# Patient Record
Sex: MALE | Race: WHITE | Employment: OTHER | URBAN - METROPOLITAN AREA
[De-identification: names, ages, dates, MRNs, and addresses within clinical notes are randomized per-mention and may not be internally consistent; named-entity substitution may affect disease eponyms.]

---

## 2017-01-27 ENCOUNTER — OFFICE VISIT (OUTPATIENT)
Dept: FAMILY MEDICINE CLINIC | Age: 82
End: 2017-01-27

## 2017-01-27 VITALS
HEIGHT: 68 IN | WEIGHT: 185 LBS | SYSTOLIC BLOOD PRESSURE: 170 MMHG | DIASTOLIC BLOOD PRESSURE: 100 MMHG | OXYGEN SATURATION: 94 % | HEART RATE: 71 BPM | BODY MASS INDEX: 28.04 KG/M2

## 2017-01-27 DIAGNOSIS — E78.2 MIXED HYPERLIPIDEMIA: ICD-10-CM

## 2017-01-27 DIAGNOSIS — I10 ESSENTIAL HYPERTENSION: Primary | ICD-10-CM

## 2017-01-27 DIAGNOSIS — M54.9 CHRONIC BACK PAIN, UNSPECIFIED BACK LOCATION, UNSPECIFIED BACK PAIN LATERALITY: ICD-10-CM

## 2017-01-27 DIAGNOSIS — Z79.899 LONG TERM USE OF DRUG: ICD-10-CM

## 2017-01-27 DIAGNOSIS — G89.29 CHRONIC BACK PAIN, UNSPECIFIED BACK LOCATION, UNSPECIFIED BACK PAIN LATERALITY: ICD-10-CM

## 2017-01-27 DIAGNOSIS — K21.9 GASTROESOPHAGEAL REFLUX DISEASE WITHOUT ESOPHAGITIS: ICD-10-CM

## 2017-01-27 DIAGNOSIS — J44.9 CHRONIC OBSTRUCTIVE PULMONARY DISEASE, UNSPECIFIED COPD TYPE (HCC): ICD-10-CM

## 2017-01-27 LAB
A/G RATIO: 1.3 (ref 1.1–2.2)
ALBUMIN SERPL-MCNC: 3.8 G/DL (ref 3.4–5)
ALP BLD-CCNC: 121 U/L (ref 40–129)
ALT SERPL-CCNC: 10 U/L (ref 10–40)
AMPHETAMINE SCREEN, URINE: NORMAL
ANION GAP SERPL CALCULATED.3IONS-SCNC: 17 MMOL/L (ref 3–16)
AST SERPL-CCNC: 14 U/L (ref 15–37)
BARBITURATE SCREEN, URINE: NORMAL
BASOPHILS ABSOLUTE: 0 K/UL (ref 0–0.2)
BASOPHILS RELATIVE PERCENT: 0.5 %
BENZODIAZEPINE SCREEN, URINE: NORMAL
BILIRUB SERPL-MCNC: <0.2 MG/DL (ref 0–1)
BUN BLDV-MCNC: 19 MG/DL (ref 7–20)
CALCIUM SERPL-MCNC: 8.5 MG/DL (ref 8.3–10.6)
CHLORIDE BLD-SCNC: 108 MMOL/L (ref 99–110)
CHOLESTEROL, TOTAL: 149 MG/DL (ref 0–199)
CO2: 24 MMOL/L (ref 21–32)
COCAINE METABOLITE SCREEN URINE: NORMAL
CREAT SERPL-MCNC: 1.2 MG/DL (ref 0.8–1.3)
EOSINOPHILS ABSOLUTE: 0.9 K/UL (ref 0–0.6)
EOSINOPHILS RELATIVE PERCENT: 10.9 %
GFR AFRICAN AMERICAN: >60
GFR NON-AFRICAN AMERICAN: 58
GLOBULIN: 2.9 G/DL
GLUCOSE BLD-MCNC: 94 MG/DL (ref 70–99)
HCT VFR BLD CALC: 38.6 % (ref 40.5–52.5)
HDLC SERPL-MCNC: 35 MG/DL (ref 40–60)
HEMOGLOBIN: 13.1 G/DL (ref 13.5–17.5)
LDL CHOLESTEROL CALCULATED: 67 MG/DL
LYMPHOCYTES ABSOLUTE: 1.6 K/UL (ref 1–5.1)
LYMPHOCYTES RELATIVE PERCENT: 20.2 %
MCH RBC QN AUTO: 33.4 PG (ref 26–34)
MCHC RBC AUTO-ENTMCNC: 34 G/DL (ref 31–36)
MCV RBC AUTO: 98.2 FL (ref 80–100)
MDMA URINE: NORMAL
METHADONE SCREEN, URINE: NORMAL
METHAMPHETAMINE, URINE: NORMAL
MONOCYTES ABSOLUTE: 0.8 K/UL (ref 0–1.3)
MONOCYTES RELATIVE PERCENT: 9.6 %
NEUTROPHILS ABSOLUTE: 4.7 K/UL (ref 1.7–7.7)
NEUTROPHILS RELATIVE PERCENT: 58.8 %
OPIATE SCREEN URINE: NORMAL
OXYCODONE SCREEN URINE: NORMAL
PDW BLD-RTO: 14.6 % (ref 12.4–15.4)
PHENCYCLIDINE SCREEN URINE: NORMAL
PLATELET # BLD: 191 K/UL (ref 135–450)
PMV BLD AUTO: 7.4 FL (ref 5–10.5)
POTASSIUM SERPL-SCNC: 4.4 MMOL/L (ref 3.5–5.1)
PROPOXYPHENE SCREEN, URINE: NORMAL
RBC # BLD: 3.93 M/UL (ref 4.2–5.9)
SODIUM BLD-SCNC: 149 MMOL/L (ref 136–145)
THC: NORMAL
TOTAL PROTEIN: 6.7 G/DL (ref 6.4–8.2)
TRICYCLIC ANTIDEPRESSANTS, UR: NORMAL
TRIGL SERPL-MCNC: 233 MG/DL (ref 0–150)
TSH SERPL DL<=0.05 MIU/L-ACNC: 1.18 UIU/ML (ref 0.27–4.2)
VLDLC SERPL CALC-MCNC: 47 MG/DL
WBC # BLD: 8 K/UL (ref 4–11)

## 2017-01-27 PROCEDURE — 99214 OFFICE O/P EST MOD 30 MIN: CPT | Performed by: NURSE PRACTITIONER

## 2017-01-27 PROCEDURE — 80305 DRUG TEST PRSMV DIR OPT OBS: CPT | Performed by: NURSE PRACTITIONER

## 2017-01-27 PROCEDURE — 36415 COLL VENOUS BLD VENIPUNCTURE: CPT | Performed by: NURSE PRACTITIONER

## 2017-01-27 RX ORDER — HYDROCODONE BITARTRATE AND ACETAMINOPHEN 7.5; 325 MG/1; MG/1
1 TABLET ORAL EVERY 8 HOURS PRN
Qty: 90 TABLET | Refills: 0 | Status: SHIPPED | OUTPATIENT
Start: 2017-01-27 | End: 2017-05-02 | Stop reason: SDUPTHER

## 2017-01-27 RX ORDER — HYDROCODONE BITARTRATE AND ACETAMINOPHEN 7.5; 325 MG/1; MG/1
1 TABLET ORAL EVERY 8 HOURS PRN
Qty: 90 TABLET | Refills: 0 | Status: SHIPPED | OUTPATIENT
Start: 2017-02-26 | End: 2017-05-02 | Stop reason: SDUPTHER

## 2017-01-27 RX ORDER — DILTIAZEM HCL 90 MG
90 TABLET ORAL 2 TIMES DAILY
Qty: 60 TABLET | Refills: 2 | Status: SHIPPED | OUTPATIENT
Start: 2017-01-27 | End: 2017-04-19 | Stop reason: SDUPTHER

## 2017-01-27 RX ORDER — IRBESARTAN 300 MG/1
300 TABLET ORAL DAILY
Qty: 30 TABLET | Refills: 2 | Status: SHIPPED | OUTPATIENT
Start: 2017-01-27 | End: 2017-04-19 | Stop reason: SDUPTHER

## 2017-01-27 RX ORDER — HYDROCODONE BITARTRATE AND ACETAMINOPHEN 7.5; 325 MG/1; MG/1
1 TABLET ORAL EVERY 8 HOURS PRN
Qty: 90 TABLET | Refills: 0 | Status: SHIPPED | OUTPATIENT
Start: 2017-03-28 | End: 2017-05-02 | Stop reason: SDUPTHER

## 2017-01-27 RX ORDER — GABAPENTIN 300 MG/1
CAPSULE ORAL
Qty: 120 CAPSULE | Refills: 2 | Status: SHIPPED | OUTPATIENT
Start: 2017-01-27 | End: 2017-05-02 | Stop reason: SDUPTHER

## 2017-01-27 ASSESSMENT — ENCOUNTER SYMPTOMS
SHORTNESS OF BREATH: 0
CHEST TIGHTNESS: 0
NAUSEA: 0
BACK PAIN: 1
DIARRHEA: 0
COUGH: 0
WHEEZING: 0
VOMITING: 0

## 2017-01-31 ENCOUNTER — TELEPHONE (OUTPATIENT)
Dept: FAMILY MEDICINE CLINIC | Age: 82
End: 2017-01-31

## 2017-04-19 DIAGNOSIS — I10 ESSENTIAL HYPERTENSION: ICD-10-CM

## 2017-04-19 RX ORDER — DILTIAZEM HCL 90 MG
TABLET ORAL
Qty: 30 TABLET | Refills: 0 | Status: SHIPPED | OUTPATIENT
Start: 2017-04-19 | End: 2017-05-02 | Stop reason: SDUPTHER

## 2017-04-19 RX ORDER — IRBESARTAN 300 MG/1
TABLET ORAL
Qty: 15 TABLET | Refills: 0 | Status: SHIPPED | OUTPATIENT
Start: 2017-04-19 | End: 2017-05-02 | Stop reason: SDUPTHER

## 2017-05-02 ENCOUNTER — OFFICE VISIT (OUTPATIENT)
Dept: FAMILY MEDICINE CLINIC | Age: 82
End: 2017-05-02

## 2017-05-02 VITALS
OXYGEN SATURATION: 95 % | SYSTOLIC BLOOD PRESSURE: 148 MMHG | HEART RATE: 53 BPM | WEIGHT: 192 LBS | BODY MASS INDEX: 29.19 KG/M2 | DIASTOLIC BLOOD PRESSURE: 80 MMHG

## 2017-05-02 DIAGNOSIS — G89.29 CHRONIC BACK PAIN, UNSPECIFIED BACK LOCATION, UNSPECIFIED BACK PAIN LATERALITY: ICD-10-CM

## 2017-05-02 DIAGNOSIS — I10 ESSENTIAL HYPERTENSION: ICD-10-CM

## 2017-05-02 DIAGNOSIS — M54.9 CHRONIC BACK PAIN, UNSPECIFIED BACK LOCATION, UNSPECIFIED BACK PAIN LATERALITY: ICD-10-CM

## 2017-05-02 DIAGNOSIS — I71.40 ABDOMINAL AORTIC ANEURYSM (AAA) WITHOUT RUPTURE: ICD-10-CM

## 2017-05-02 DIAGNOSIS — E78.2 MIXED HYPERLIPIDEMIA: ICD-10-CM

## 2017-05-02 DIAGNOSIS — J44.9 CHRONIC OBSTRUCTIVE PULMONARY DISEASE, UNSPECIFIED COPD TYPE (HCC): Primary | ICD-10-CM

## 2017-05-02 DIAGNOSIS — G50.0 TRIGEMINAL NEURALGIA: ICD-10-CM

## 2017-05-02 DIAGNOSIS — K21.9 GASTROESOPHAGEAL REFLUX DISEASE WITHOUT ESOPHAGITIS: ICD-10-CM

## 2017-05-02 PROCEDURE — 99214 OFFICE O/P EST MOD 30 MIN: CPT | Performed by: NURSE PRACTITIONER

## 2017-05-02 RX ORDER — DILTIAZEM HCL 90 MG
TABLET ORAL
Qty: 30 TABLET | Refills: 5 | Status: SHIPPED | OUTPATIENT
Start: 2017-05-02 | End: 2017-10-26 | Stop reason: SDUPTHER

## 2017-05-02 RX ORDER — HYDROCODONE BITARTRATE AND ACETAMINOPHEN 7.5; 325 MG/1; MG/1
1 TABLET ORAL EVERY 8 HOURS PRN
Qty: 90 TABLET | Refills: 0 | Status: SHIPPED | OUTPATIENT
Start: 2017-05-02 | End: 2017-07-31 | Stop reason: SDUPTHER

## 2017-05-02 RX ORDER — GABAPENTIN 300 MG/1
CAPSULE ORAL
Qty: 120 CAPSULE | Refills: 2 | Status: SHIPPED | OUTPATIENT
Start: 2017-05-02 | End: 2017-10-26 | Stop reason: SDUPTHER

## 2017-05-02 RX ORDER — METOPROLOL TARTRATE 50 MG/1
50 TABLET, FILM COATED ORAL 2 TIMES DAILY
Qty: 60 TABLET | Refills: 5 | Status: SHIPPED | OUTPATIENT
Start: 2017-05-02 | End: 2017-10-26 | Stop reason: SDUPTHER

## 2017-05-02 RX ORDER — HYDROCODONE BITARTRATE AND ACETAMINOPHEN 7.5; 325 MG/1; MG/1
1 TABLET ORAL EVERY 8 HOURS PRN
Qty: 90 TABLET | Refills: 0 | Status: SHIPPED | OUTPATIENT
Start: 2017-06-01 | End: 2017-07-31 | Stop reason: SDUPTHER

## 2017-05-02 RX ORDER — HYDROCODONE BITARTRATE AND ACETAMINOPHEN 7.5; 325 MG/1; MG/1
1 TABLET ORAL EVERY 8 HOURS PRN
Qty: 90 TABLET | Refills: 0 | Status: SHIPPED | OUTPATIENT
Start: 2017-07-01 | End: 2017-07-31 | Stop reason: SDUPTHER

## 2017-05-02 RX ORDER — PHENYTOIN SODIUM 100 MG/1
CAPSULE, EXTENDED RELEASE ORAL
Qty: 150 CAPSULE | Refills: 5 | Status: SHIPPED | OUTPATIENT
Start: 2017-05-02 | End: 2017-10-26 | Stop reason: SDUPTHER

## 2017-05-02 RX ORDER — PHENYTOIN SODIUM 300 MG/1
300 CAPSULE, EXTENDED RELEASE ORAL NIGHTLY
Qty: 90 CAPSULE | Refills: 5 | Status: SHIPPED | OUTPATIENT
Start: 2017-05-02 | End: 2017-10-26 | Stop reason: DRUGHIGH

## 2017-05-02 RX ORDER — OMEPRAZOLE 40 MG/1
40 CAPSULE, DELAYED RELEASE ORAL DAILY
Qty: 30 CAPSULE | Refills: 5 | Status: SHIPPED | OUTPATIENT
Start: 2017-05-02 | End: 2017-10-26 | Stop reason: SDUPTHER

## 2017-05-02 RX ORDER — PRAVASTATIN SODIUM 40 MG
40 TABLET ORAL EVERY EVENING
Qty: 30 TABLET | Refills: 5 | Status: SHIPPED | OUTPATIENT
Start: 2017-05-02 | End: 2017-10-26 | Stop reason: SDUPTHER

## 2017-05-02 RX ORDER — IRBESARTAN 300 MG/1
TABLET ORAL
Qty: 30 TABLET | Refills: 5 | Status: SHIPPED | OUTPATIENT
Start: 2017-05-02 | End: 2017-10-26 | Stop reason: SDUPTHER

## 2017-05-02 ASSESSMENT — ENCOUNTER SYMPTOMS
VOMITING: 0
SHORTNESS OF BREATH: 0
BACK PAIN: 1
NAUSEA: 0
WHEEZING: 0
COUGH: 0
CHEST TIGHTNESS: 0
DIARRHEA: 0

## 2017-07-31 ENCOUNTER — OFFICE VISIT (OUTPATIENT)
Dept: FAMILY MEDICINE CLINIC | Age: 82
End: 2017-07-31

## 2017-07-31 VITALS
HEIGHT: 68 IN | SYSTOLIC BLOOD PRESSURE: 150 MMHG | WEIGHT: 200 LBS | HEART RATE: 63 BPM | BODY MASS INDEX: 30.31 KG/M2 | DIASTOLIC BLOOD PRESSURE: 80 MMHG

## 2017-07-31 DIAGNOSIS — R56.9 SEIZURES (HCC): ICD-10-CM

## 2017-07-31 DIAGNOSIS — G50.0 TRIGEMINAL NEURALGIA: Primary | ICD-10-CM

## 2017-07-31 DIAGNOSIS — M54.9 CHRONIC BACK PAIN, UNSPECIFIED BACK LOCATION, UNSPECIFIED BACK PAIN LATERALITY: ICD-10-CM

## 2017-07-31 DIAGNOSIS — G89.29 CHRONIC BACK PAIN, UNSPECIFIED BACK LOCATION, UNSPECIFIED BACK PAIN LATERALITY: ICD-10-CM

## 2017-07-31 PROCEDURE — 99213 OFFICE O/P EST LOW 20 MIN: CPT | Performed by: FAMILY MEDICINE

## 2017-07-31 RX ORDER — GABAPENTIN 300 MG/1
CAPSULE ORAL
Qty: 120 CAPSULE | Refills: 2 | Status: CANCELLED | OUTPATIENT
Start: 2017-07-31

## 2017-07-31 RX ORDER — HYDROCODONE BITARTRATE AND ACETAMINOPHEN 7.5; 325 MG/1; MG/1
1 TABLET ORAL EVERY 8 HOURS PRN
Qty: 90 TABLET | Refills: 0 | Status: SHIPPED | OUTPATIENT
Start: 2017-08-30 | End: 2017-10-26 | Stop reason: SDUPTHER

## 2017-07-31 RX ORDER — HYDROCODONE BITARTRATE AND ACETAMINOPHEN 7.5; 325 MG/1; MG/1
1 TABLET ORAL EVERY 8 HOURS PRN
Qty: 90 TABLET | Refills: 0 | Status: SHIPPED | OUTPATIENT
Start: 2017-07-31 | End: 2017-10-26 | Stop reason: SDUPTHER

## 2017-07-31 RX ORDER — HYDROCODONE BITARTRATE AND ACETAMINOPHEN 7.5; 325 MG/1; MG/1
1 TABLET ORAL EVERY 8 HOURS PRN
Qty: 90 TABLET | Refills: 0 | Status: SHIPPED | OUTPATIENT
Start: 2017-09-29 | End: 2017-10-26 | Stop reason: SDUPTHER

## 2017-07-31 ASSESSMENT — ENCOUNTER SYMPTOMS
COUGH: 0
CONSTIPATION: 0
CHEST TIGHTNESS: 0
ABDOMINAL PAIN: 0
DIARRHEA: 0
CHOKING: 0
SHORTNESS OF BREATH: 0
BACK PAIN: 0

## 2017-10-26 ENCOUNTER — OFFICE VISIT (OUTPATIENT)
Dept: FAMILY MEDICINE CLINIC | Age: 82
End: 2017-10-26

## 2017-10-26 VITALS
WEIGHT: 205 LBS | HEART RATE: 60 BPM | BODY MASS INDEX: 31.07 KG/M2 | HEIGHT: 68 IN | DIASTOLIC BLOOD PRESSURE: 70 MMHG | OXYGEN SATURATION: 93 % | SYSTOLIC BLOOD PRESSURE: 126 MMHG

## 2017-10-26 DIAGNOSIS — G89.29 CHRONIC BACK PAIN, UNSPECIFIED BACK LOCATION, UNSPECIFIED BACK PAIN LATERALITY: ICD-10-CM

## 2017-10-26 DIAGNOSIS — Z23 NEED FOR INFLUENZA VACCINATION: ICD-10-CM

## 2017-10-26 DIAGNOSIS — K21.9 GASTROESOPHAGEAL REFLUX DISEASE WITHOUT ESOPHAGITIS: ICD-10-CM

## 2017-10-26 DIAGNOSIS — M54.9 CHRONIC BACK PAIN, UNSPECIFIED BACK LOCATION, UNSPECIFIED BACK PAIN LATERALITY: ICD-10-CM

## 2017-10-26 DIAGNOSIS — I10 ESSENTIAL HYPERTENSION: ICD-10-CM

## 2017-10-26 DIAGNOSIS — R07.81 RIB PAIN ON LEFT SIDE: Primary | ICD-10-CM

## 2017-10-26 DIAGNOSIS — G50.0 TRIGEMINAL NEURALGIA: ICD-10-CM

## 2017-10-26 DIAGNOSIS — R56.9 SEIZURES (HCC): ICD-10-CM

## 2017-10-26 PROCEDURE — 90662 IIV NO PRSV INCREASED AG IM: CPT | Performed by: FAMILY MEDICINE

## 2017-10-26 PROCEDURE — 99214 OFFICE O/P EST MOD 30 MIN: CPT | Performed by: FAMILY MEDICINE

## 2017-10-26 PROCEDURE — G0008 ADMIN INFLUENZA VIRUS VAC: HCPCS | Performed by: FAMILY MEDICINE

## 2017-10-26 RX ORDER — OMEPRAZOLE 40 MG/1
40 CAPSULE, DELAYED RELEASE ORAL DAILY
Qty: 30 CAPSULE | Refills: 5 | Status: SHIPPED | OUTPATIENT
Start: 2017-10-26

## 2017-10-26 RX ORDER — HYDROCODONE BITARTRATE AND ACETAMINOPHEN 7.5; 325 MG/1; MG/1
1 TABLET ORAL EVERY 8 HOURS PRN
Qty: 90 TABLET | Refills: 0 | Status: SHIPPED | OUTPATIENT
Start: 2017-11-28 | End: 2018-01-18 | Stop reason: SDUPTHER

## 2017-10-26 RX ORDER — IRBESARTAN 300 MG/1
TABLET ORAL
Qty: 30 TABLET | Refills: 5 | Status: SHIPPED | OUTPATIENT
Start: 2017-10-26 | End: 2020-10-06

## 2017-10-26 RX ORDER — HYDROCODONE BITARTRATE AND ACETAMINOPHEN 7.5; 325 MG/1; MG/1
1 TABLET ORAL EVERY 8 HOURS PRN
Qty: 90 TABLET | Refills: 0 | Status: SHIPPED | OUTPATIENT
Start: 2017-10-29 | End: 2018-01-18 | Stop reason: SDUPTHER

## 2017-10-26 RX ORDER — METOPROLOL TARTRATE 50 MG/1
50 TABLET, FILM COATED ORAL 2 TIMES DAILY
Qty: 60 TABLET | Refills: 5 | Status: SHIPPED | OUTPATIENT
Start: 2017-10-26

## 2017-10-26 RX ORDER — MONTELUKAST SODIUM 4 MG/1
1 TABLET, CHEWABLE ORAL 2 TIMES DAILY
Qty: 60 TABLET | Refills: 3 | Status: ON HOLD | OUTPATIENT
Start: 2017-10-26 | End: 2020-02-11

## 2017-10-26 RX ORDER — GABAPENTIN 300 MG/1
CAPSULE ORAL
Qty: 120 CAPSULE | Refills: 2 | Status: SHIPPED | OUTPATIENT
Start: 2017-10-26 | End: 2018-01-18 | Stop reason: SDUPTHER

## 2017-10-26 RX ORDER — PHENYTOIN SODIUM 100 MG/1
CAPSULE, EXTENDED RELEASE ORAL
Qty: 150 CAPSULE | Refills: 5 | Status: SHIPPED | OUTPATIENT
Start: 2017-10-26

## 2017-10-26 RX ORDER — PRAVASTATIN SODIUM 40 MG
40 TABLET ORAL EVERY EVENING
Qty: 30 TABLET | Refills: 5 | Status: ON HOLD | OUTPATIENT
Start: 2017-10-26 | End: 2020-02-11

## 2017-10-26 RX ORDER — DILTIAZEM HCL 90 MG
TABLET ORAL
Qty: 30 TABLET | Refills: 5 | Status: ON HOLD | OUTPATIENT
Start: 2017-10-26 | End: 2020-02-15 | Stop reason: HOSPADM

## 2017-10-26 RX ORDER — HYDROCODONE BITARTRATE AND ACETAMINOPHEN 7.5; 325 MG/1; MG/1
1 TABLET ORAL EVERY 8 HOURS PRN
Qty: 90 TABLET | Refills: 0 | Status: SHIPPED | OUTPATIENT
Start: 2017-12-28 | End: 2018-01-18 | Stop reason: SDUPTHER

## 2017-10-26 ASSESSMENT — PATIENT HEALTH QUESTIONNAIRE - PHQ9
SUM OF ALL RESPONSES TO PHQ9 QUESTIONS 1 & 2: 0
2. FEELING DOWN, DEPRESSED OR HOPELESS: 0
SUM OF ALL RESPONSES TO PHQ QUESTIONS 1-9: 0
1. LITTLE INTEREST OR PLEASURE IN DOING THINGS: 0

## 2017-10-26 ASSESSMENT — ENCOUNTER SYMPTOMS
CHEST TIGHTNESS: 0
COUGH: 0
ABDOMINAL DISTENTION: 0
SHORTNESS OF BREATH: 0
DIARRHEA: 1
ABDOMINAL PAIN: 0
BACK PAIN: 1

## 2017-10-26 NOTE — PROGRESS NOTES
Subjective:      Patient ID: Sharalyn Kehr is a 80 y.o. male. HPIFell and hurt L rib cage 5 days ago    Review of Systems   HENT: Negative for congestion. Respiratory: Negative for cough, chest tightness and shortness of breath. Cardiovascular: Positive for chest pain (point tenderness L 8-9 rib). Gastrointestinal: Positive for diarrhea (for years  first I have heard this complaint). Negative for abdominal distention and abdominal pain. Genitourinary: Negative for difficulty urinating. Musculoskeletal: Positive for arthralgias and back pain. Neurological: Positive for headaches. Objective:   Physical Exam   Constitutional: He appears well-developed. HENT:   Head: Normocephalic. Mouth/Throat: Oropharynx is clear and moist.   Eyes: Conjunctivae and EOM are normal.   Neck: Normal range of motion. Neck supple. No thyromegaly present. Cardiovascular: Normal rate, regular rhythm, normal heart sounds and intact distal pulses. Pulmonary/Chest: Effort normal and breath sounds normal. He has no wheezes. He has no rales. Musculoskeletal: Normal range of motion. He exhibits tenderness (rib cage). He exhibits no edema. Neurological: He is alert. He has normal reflexes. Skin: Skin is warm and dry. No rash (no eccymosis) noted. No erythema. Psychiatric: His behavior is normal. Thought content normal.       Assessment:      1. Rib pain on left side     2. Essential hypertension  diltiazem (CARDIZEM) 90 MG tablet    irbesartan (AVAPRO) 300 MG tablet   3. Chronic back pain, unspecified back location, unspecified back pain laterality  gabapentin (NEURONTIN) 300 MG capsule    HYDROcodone-acetaminophen (NORCO) 7.5-325 MG per tablet    HYDROcodone-acetaminophen (NORCO) 7.5-325 MG per tablet    HYDROcodone-acetaminophen (NORCO) 7.5-325 MG per tablet   4. Trigeminal neuralgia     5. Gastroesophageal reflux disease without esophagitis     6.  Seizures ([address]             Plan:      Heat to L chest wall   check X ray   Requested Prescriptions     Signed Prescriptions Disp Refills    diltiazem (CARDIZEM) 90 MG tablet 30 tablet 5     Sig: TAKE 1 TABLET BY MOUTH TWICE A DAY    irbesartan (AVAPRO) 300 MG tablet 30 tablet 5     Sig: TAKE 1 TABLET BY MOUTH EVERY DAY    gabapentin (NEURONTIN) 300 MG capsule 120 capsule 2     Sig: TAKE ONE CAPSULE BY MOUTH 4 TIMES A DAY    metoprolol tartrate (LOPRESSOR) 50 MG tablet 60 tablet 5     Sig: Take 1 tablet by mouth 2 times daily    phenytoin (DILANTIN) 100 MG ER capsule 150 capsule 5     Sig: Take 5 tablets daily    pravastatin (PRAVACHOL) 40 MG tablet 30 tablet 5     Sig: Take 1 tablet by mouth every evening    omeprazole (PRILOSEC) 40 MG delayed release capsule 30 capsule 5     Sig: Take 1 capsule by mouth daily    HYDROcodone-acetaminophen (NORCO) 7.5-325 MG per tablet 90 tablet 0     Sig: Take 1 tablet by mouth every 8 hours as needed for Pain .  HYDROcodone-acetaminophen (NORCO) 7.5-325 MG per tablet 90 tablet 0     Sig: Take 1 tablet by mouth every 8 hours as needed for Pain .  HYDROcodone-acetaminophen (NORCO) 7.5-325 MG per tablet 90 tablet 0     Sig: Take 1 tablet by mouth every 8 hours as needed for Pain .     colestipol (COLESTID) 1 g tablet 60 tablet 3     Sig: Take 1 tablet by mouth 2 times daily

## 2018-01-18 ENCOUNTER — OFFICE VISIT (OUTPATIENT)
Dept: FAMILY MEDICINE CLINIC | Age: 83
End: 2018-01-18

## 2018-01-18 VITALS
SYSTOLIC BLOOD PRESSURE: 124 MMHG | OXYGEN SATURATION: 94 % | DIASTOLIC BLOOD PRESSURE: 70 MMHG | HEIGHT: 68 IN | WEIGHT: 201 LBS | HEART RATE: 60 BPM | BODY MASS INDEX: 30.46 KG/M2 | TEMPERATURE: 98 F

## 2018-01-18 DIAGNOSIS — G89.29 CHRONIC BACK PAIN, UNSPECIFIED BACK LOCATION, UNSPECIFIED BACK PAIN LATERALITY: Primary | ICD-10-CM

## 2018-01-18 DIAGNOSIS — Z79.899 LONG TERM USE OF DRUG: ICD-10-CM

## 2018-01-18 DIAGNOSIS — R56.9 SEIZURES (HCC): ICD-10-CM

## 2018-01-18 DIAGNOSIS — J44.9 CHRONIC OBSTRUCTIVE PULMONARY DISEASE, UNSPECIFIED COPD TYPE (HCC): ICD-10-CM

## 2018-01-18 DIAGNOSIS — E78.2 MIXED HYPERLIPIDEMIA: ICD-10-CM

## 2018-01-18 DIAGNOSIS — Z23 NEED FOR PROPHYLACTIC VACCINATION AGAINST STREPTOCOCCUS PNEUMONIAE (PNEUMOCOCCUS): ICD-10-CM

## 2018-01-18 DIAGNOSIS — K21.9 GASTROESOPHAGEAL REFLUX DISEASE WITHOUT ESOPHAGITIS: ICD-10-CM

## 2018-01-18 DIAGNOSIS — M54.9 CHRONIC BACK PAIN, UNSPECIFIED BACK LOCATION, UNSPECIFIED BACK PAIN LATERALITY: Primary | ICD-10-CM

## 2018-01-18 PROCEDURE — 90670 PCV13 VACCINE IM: CPT | Performed by: NURSE PRACTITIONER

## 2018-01-18 PROCEDURE — 99214 OFFICE O/P EST MOD 30 MIN: CPT | Performed by: NURSE PRACTITIONER

## 2018-01-18 PROCEDURE — 80305 DRUG TEST PRSMV DIR OPT OBS: CPT | Performed by: NURSE PRACTITIONER

## 2018-01-18 PROCEDURE — G0009 ADMIN PNEUMOCOCCAL VACCINE: HCPCS | Performed by: NURSE PRACTITIONER

## 2018-01-18 RX ORDER — HYDROCODONE BITARTRATE AND ACETAMINOPHEN 7.5; 325 MG/1; MG/1
1 TABLET ORAL EVERY 8 HOURS PRN
Qty: 90 TABLET | Refills: 0 | Status: SHIPPED | OUTPATIENT
Start: 2018-02-26 | End: 2018-03-28

## 2018-01-18 RX ORDER — HYDROCODONE BITARTRATE AND ACETAMINOPHEN 7.5; 325 MG/1; MG/1
1 TABLET ORAL EVERY 8 HOURS PRN
Qty: 90 TABLET | Refills: 0 | Status: SHIPPED | OUTPATIENT
Start: 2018-03-28 | End: 2018-04-27

## 2018-01-18 RX ORDER — GABAPENTIN 300 MG/1
CAPSULE ORAL
Qty: 120 CAPSULE | Refills: 2 | Status: ON HOLD | OUTPATIENT
Start: 2018-01-27 | End: 2020-02-11

## 2018-01-18 RX ORDER — HYDROCODONE BITARTRATE AND ACETAMINOPHEN 7.5; 325 MG/1; MG/1
1 TABLET ORAL EVERY 8 HOURS PRN
Qty: 90 TABLET | Refills: 0 | Status: SHIPPED | OUTPATIENT
Start: 2018-01-27 | End: 2018-02-26

## 2018-01-18 ASSESSMENT — ENCOUNTER SYMPTOMS
CHEST TIGHTNESS: 0
WHEEZING: 0
VOMITING: 0
NAUSEA: 0
SHORTNESS OF BREATH: 0
COUGH: 0
BACK PAIN: 1
DIARRHEA: 0

## 2018-01-18 NOTE — PROGRESS NOTES
Subjective:      Patient ID: Virginia Bay is a 80 y.o. male. HPI  Patient with history of chronic back pain with radiation bilateral, COPD, GERD, post traumatic seizures, and HLD is here for follow up and refills. He is feeling well. He reports some imbalance issues, which are better when he is not taking gabapentin; however the gabapentin significantly helps his neuropathy symptoms both from back pain and facial/head trauma. He has a cane and a walker to assist in his ambulation, he just doesn't always use them. He has some \"gas\" that has been present for years, and he mentions this to see if one of his meds may be contributing. He also has multiple stools per day, but reports that this is his baseline. No chest pain or shortness of breath. Past Medical History:   Diagnosis Date    Arthritis     Basal ganglia infarction [facility])     pt unaware of this    Bladder [facility]     Cataract     Chronic ethmoidal sinusitis     COPD (chronic obstructive pulmonary disease) (HCC)     Diabetes mellitus (HCC)     pre-diabetic-no meds    Gas pain     GERD (gastroesophageal reflux disease)     History of nephrectomy     History of renal calculi     HTN (hypertension)     Hyperinflation of lungs     Hyperlipidemia     Hypoplasia of one kidney     Seizures (HCC)     last seizure-6-7 months ago    Sleep disorder     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 HENT: Negative for congestion. Respiratory: Negative for cough, chest tightness, shortness of breath and wheezing. COPD   Cardiovascular: Negative for chest pain, palpitations and leg swelling. HTN, HLD, AAA   Gastrointestinal: Negative for diarrhea, nausea and vomiting. GERD   Genitourinary: Negative for difficulty urinating, dysuria, flank pain and hematuria. pulmonary disease, unspecified COPD type ([facility]     3. Gastroesophageal reflux disease without esophagitis     4. Seizures ([facility]     5. Mixed hyperlipidemia     6. Need for prophylactic vaccination against Streptococcus pneumoniae (pneumococcus)  Pneumococcal conjugate vaccine 13-valent PCV13   7. Long term use of drug  POCT Rapid Drug Screen     Back pain is stable. He is unable to provide a urine specimen today. Not due until the end of the month. Will have him give specimen on return. COPD stable. GERD stable, but his PPI may be contributing to GI symptoms. He is to try using Lactaid to see if this helps. If not, will d/c ppi and start H2 blocker. Seizures>>none for over a year. HLD check on return. Urine drug screen on return. Controlled Substances Monitoring:     Attestation: The Prescription Monitoring Report for this patient was reviewed today. (Franco Caballero CNP)  Documentation: Possible medication side effects, risk of tolerance and/or dependence, and alternative treatments discussed., No signs of potential drug abuse or diversion identified. (Franco Caballero CNP)  Medication Contracts: Existing medication contract. (Franco Caballero CNP)        Plan:          Discussed use, benefit, and side effects of prescribed medications. Barriers to medication compliance addressed. All patient questions answered. Pt voiced understanding.

## 2018-08-01 RX ORDER — OMEPRAZOLE 40 MG/1
40 CAPSULE, DELAYED RELEASE ORAL DAILY
Qty: 30 CAPSULE | Refills: 5 | OUTPATIENT
Start: 2018-08-01

## 2020-02-10 ENCOUNTER — HOSPITAL ENCOUNTER (INPATIENT)
Dept: CARDIAC CATH/INVASIVE PROCEDURES | Age: 85
LOS: 6 days | Discharge: HOME HEALTH | DRG: 280 | End: 2020-02-16
Attending: INTERNAL MEDICINE | Admitting: INTERNAL MEDICINE
Payer: MEDICARE

## 2020-02-10 PROBLEM — Z86.59 MENTAL STATUS CHANGE RESOLVED: Status: ACTIVE | Noted: 2020-02-10

## 2020-02-10 PROBLEM — I21.4 NSTEMI (NON-ST ELEVATED MYOCARDIAL INFARCTION) (HCC): Status: ACTIVE | Noted: 2020-02-10

## 2020-02-10 PROCEDURE — C1769 GUIDE WIRE: HCPCS

## 2020-02-10 PROCEDURE — 99152 MOD SED SAME PHYS/QHP 5/>YRS: CPT

## 2020-02-10 PROCEDURE — 6360000002 HC RX W HCPCS

## 2020-02-10 PROCEDURE — 94760 N-INVAS EAR/PLS OXIMETRY 1: CPT

## 2020-02-10 PROCEDURE — 99153 MOD SED SAME PHYS/QHP EA: CPT

## 2020-02-10 PROCEDURE — 2580000003 HC RX 258: Performed by: INTERNAL MEDICINE

## 2020-02-10 PROCEDURE — 93458 L HRT ARTERY/VENTRICLE ANGIO: CPT

## 2020-02-10 PROCEDURE — 99223 1ST HOSP IP/OBS HIGH 75: CPT | Performed by: INTERNAL MEDICINE

## 2020-02-10 PROCEDURE — 2709999900 HC NON-CHARGEABLE SUPPLY

## 2020-02-10 PROCEDURE — C1894 INTRO/SHEATH, NON-LASER: HCPCS

## 2020-02-10 PROCEDURE — 93458 L HRT ARTERY/VENTRICLE ANGIO: CPT | Performed by: INTERNAL MEDICINE

## 2020-02-10 PROCEDURE — 4A023N7 MEASUREMENT OF CARDIAC SAMPLING AND PRESSURE, LEFT HEART, PERCUTANEOUS APPROACH: ICD-10-PCS | Performed by: INTERNAL MEDICINE

## 2020-02-10 PROCEDURE — B2111ZZ FLUOROSCOPY OF MULTIPLE CORONARY ARTERIES USING LOW OSMOLAR CONTRAST: ICD-10-PCS | Performed by: INTERNAL MEDICINE

## 2020-02-10 PROCEDURE — 92928 PRQ TCAT PLMT NTRAC ST 1 LES: CPT | Performed by: INTERNAL MEDICINE

## 2020-02-10 PROCEDURE — 1200000000 HC SEMI PRIVATE

## 2020-02-10 PROCEDURE — 6360000004 HC RX CONTRAST MEDICATION: Performed by: INTERNAL MEDICINE

## 2020-02-10 PROCEDURE — 2500000003 HC RX 250 WO HCPCS

## 2020-02-10 RX ORDER — ACETAMINOPHEN 325 MG/1
650 TABLET ORAL EVERY 4 HOURS PRN
Status: DISCONTINUED | OUTPATIENT
Start: 2020-02-10 | End: 2020-02-16 | Stop reason: HOSPADM

## 2020-02-10 RX ORDER — MIDAZOLAM HYDROCHLORIDE 1 MG/ML
2 INJECTION INTRAMUSCULAR; INTRAVENOUS
Status: ACTIVE | OUTPATIENT
Start: 2020-02-10 | End: 2020-02-10

## 2020-02-10 RX ORDER — SODIUM CHLORIDE 0.9 % (FLUSH) 0.9 %
10 SYRINGE (ML) INJECTION PRN
Status: DISCONTINUED | OUTPATIENT
Start: 2020-02-10 | End: 2020-02-13

## 2020-02-10 RX ORDER — SODIUM CHLORIDE 9 MG/ML
INJECTION, SOLUTION INTRAVENOUS CONTINUOUS
Status: DISCONTINUED | OUTPATIENT
Start: 2020-02-10 | End: 2020-02-11

## 2020-02-10 RX ORDER — ATROPINE SULFATE 0.4 MG/ML
0.5 AMPUL (ML) INJECTION
Status: DISPENSED | OUTPATIENT
Start: 2020-02-10 | End: 2020-02-10

## 2020-02-10 RX ORDER — ONDANSETRON 2 MG/ML
4 INJECTION INTRAMUSCULAR; INTRAVENOUS EVERY 6 HOURS PRN
Status: DISCONTINUED | OUTPATIENT
Start: 2020-02-10 | End: 2020-02-16 | Stop reason: HOSPADM

## 2020-02-10 RX ORDER — 0.9 % SODIUM CHLORIDE 0.9 %
500 INTRAVENOUS SOLUTION INTRAVENOUS PRN
Status: DISCONTINUED | OUTPATIENT
Start: 2020-02-10 | End: 2020-02-16 | Stop reason: HOSPADM

## 2020-02-10 RX ORDER — SODIUM CHLORIDE 0.9 % (FLUSH) 0.9 %
10 SYRINGE (ML) INJECTION EVERY 12 HOURS SCHEDULED
Status: DISCONTINUED | OUTPATIENT
Start: 2020-02-10 | End: 2020-02-13

## 2020-02-10 RX ORDER — SODIUM CHLORIDE 9 MG/ML
INJECTION, SOLUTION INTRAVENOUS CONTINUOUS
Status: DISCONTINUED | OUTPATIENT
Start: 2020-02-10 | End: 2020-02-10

## 2020-02-10 RX ORDER — FENTANYL CITRATE 50 UG/ML
25 INJECTION, SOLUTION INTRAMUSCULAR; INTRAVENOUS
Status: ACTIVE | OUTPATIENT
Start: 2020-02-10 | End: 2020-02-10

## 2020-02-10 RX ORDER — MORPHINE SULFATE 2 MG/ML
2 INJECTION, SOLUTION INTRAMUSCULAR; INTRAVENOUS
Status: ACTIVE | OUTPATIENT
Start: 2020-02-10 | End: 2020-02-10

## 2020-02-10 RX ADMIN — SODIUM CHLORIDE: 9 INJECTION, SOLUTION INTRAVENOUS at 18:18

## 2020-02-10 RX ADMIN — SODIUM CHLORIDE, PRESERVATIVE FREE 10 ML: 5 INJECTION INTRAVENOUS at 20:42

## 2020-02-10 RX ADMIN — IOPAMIDOL 71 ML: 755 INJECTION, SOLUTION INTRAVENOUS at 13:58

## 2020-02-10 ASSESSMENT — PAIN SCALES - GENERAL: PAINLEVEL_OUTOF10: 0

## 2020-02-10 NOTE — PLAN OF CARE
Problem: Risk for Impaired Skin Integrity  Goal: Tissue integrity - skin and mucous membranes  Description  Structural intactness and normal physiological function of skin and  mucous membranes. Outcome: Ongoing  Note:   Will monitor skin and mucous membranes. Will turn patient every 2 hours, monitor for friction and sheering, and change dressings as needed. Will preform skin assessment every shift. Electronically signed by Rubén Amaya RN on 2/10/2020 at 6:41 PM      Problem: Falls - Risk of:  Goal: Will remain free from falls  Description  Will remain free from falls  Outcome: Ongoing  Note:   Fall risk assessment completed. Fall precautions in place. Call light within reach. Pt educated on calling for assistance before getting up. Walkway free of clutter. Will continue to monitor.   Electronically signed by uRbén Amaya RN on 2/10/2020 at 6:41 PM

## 2020-02-10 NOTE — PRE SEDATION
squamous    TOTAL NEPHRECTOMY Left 1994         Medications:  Current Outpatient Medications   Medication Sig Dispense Refill    gabapentin (NEURONTIN) 300 MG capsule TAKE ONE CAPSULE BY MOUTH 4 TIMES A DAY. 120 capsule 2    diltiazem (CARDIZEM) 90 MG tablet TAKE 1 TABLET BY MOUTH TWICE A DAY 30 tablet 5    irbesartan (AVAPRO) 300 MG tablet TAKE 1 TABLET BY MOUTH EVERY DAY 30 tablet 5    metoprolol tartrate (LOPRESSOR) 50 MG tablet Take 1 tablet by mouth 2 times daily 60 tablet 5    phenytoin (DILANTIN) 100 MG ER capsule Take 5 tablets daily 150 capsule 5    pravastatin (PRAVACHOL) 40 MG tablet Take 1 tablet by mouth every evening 30 tablet 5    omeprazole (PRILOSEC) 40 MG delayed release capsule Take 1 capsule by mouth daily 30 capsule 5    colestipol (COLESTID) 1 g tablet Take 1 tablet by mouth 2 times daily 60 tablet 3    guaiFENesin (MUCINEX) 600 MG SR tablet Take 2 tablets by mouth 2 times daily 100 tablet 2    aspirin 81 MG EC tablet Take 1 tablet by mouth daily 30 tablet 3     No current facility-administered medications for this encounter. Pre-Sedation:    Pre-Sedation Documentation and Exam:  I have personally completed a history, physical exam & review of systems for this patient (see notes). Prior History of Anesthesia Complications:   none    Modified Mallampati:  I (soft palate, uvula, fauces, tonsillar pillars visible)    ASA Classification:  Class 3 - A patient with severe systemic disease that limits activity but is not incapacitating      Duke Scale: Activity:  2 - Able to move 4 extremities voluntarily on command  Respiration:  2 - Able to breathe deeply and cough freely  Circulation:  2 - BP+/- 20mmHg of normal  Consciousness:  2 - Fully awake  Oxygen Saturation (color):  2 - Able to maintain oxygen saturation >92% on room air    Sedation/Anesthesia Plan:  Guard the patient's safety and welfare. Minimize physical discomfort and pain.   Minimize negative psychological

## 2020-02-10 NOTE — PROGRESS NOTES
Hospitalist notified that Dr. Riki Wood will manage care.   Dr. Riki Wood agreeable to patient going to  instead of [facility]

## 2020-02-10 NOTE — PROCEDURES
[address] [address]                            CARDIAC CATHETERIZATION    PATIENT NAME: Conrad Keating                 :        1934  MED REC NO:   [id number]                          ROOM:  ACCOUNT NO:   [de-identified]                           ADMIT DATE: 02/10/2020  PROVIDER:     Brenda Pappas MD    DATE OF PROCEDURE:  02/10/2020    REFERRING PHYSICIAN:  Yakov Gray MD    BRIEF HISTORY:  The patient is an 70-year-old gentleman who presented to  [facility] with episode of where he was staring and blank  for 2 to 3 minutes. There was no loss of consciousness. His son said  that he was sitting in a chair and was just staring with no response for  about 3 minutes. He did not slump over or lose consciousness. He was  breathing. He came to the ER and was admitted to the hospital.  His  troponin is just borderline 0.11. There is no chest pain history. EKG  shows possible old inferior infarct with slight ST elevations in there. PROCEDURE PERFORMED:  Left heart catheterization and coronary angiogram  performed through the right femoral artery. CATHETERS USED:  1.  A 23-mm long sheath because of tortuosity in the groin. 2.  Amplatz catheter, Amplatz wire, JR4, JL4, and a pigtail catheter. HEMODYNAMICS:  Aortic pressure was 130/80. Left ventricular pressure  was 130/10 mmHg. There was no gradient across the aortic valve. LV ANGIOGRAM:  Performed in the GARAY 30 degrees projection showed normal  left ventricular size and systolic function. Ejection fraction 55% with  distal inferior small area of hypokinesis. CORONARY ANGIOGRAM FINDINGS:  1. This is a codominant circulation. The arteries are calcified. 2.  Left main is free of disease. 3.  The LAD is large and free of disease. 4.  The circumflex artery is codominant and is free of disease.   5.  The right coronary artery in the mid segment is heavily calcified  and appears to have a subtotal lesion with very faint antegrade flow. It is hard to say whether this is new or old. The PDA branch has  collaterals from the left circulation and some antegrade flow. CONCLUSION:  Single-vessel coronary artery disease with subtotal  occlusion of mid right coronary artery. This is a codominant vessel  with collaterals from the left circulation. It is not certain whether  this is new or old. Nevertheless, given his limited functional  capacity, single kidney with a creatinine 1.3, we decided to treat him  medically. He should do fine. He has no chest pain either.     EBL: Less than 20 cc    Shashi Bernard MD    D: 02/10/2020 14:38:08       T: 02/10/2020 15:32:27     [email or web address]  Job#: [id number]     Doc#: [id number]    CC:

## 2020-02-10 NOTE — PROGRESS NOTES
Patient becoming confused. Able to tell me name and place. Spoke with Patient's grandson, Jc, via phone and said confusion is normal for the patient in the evening. Jc was able to answer admission questions. Patient resting in bed watching TV. Puncture site dressing in right groin clean, dry, and intact. VSS. IV fluids infusing. Bed alarm on. Call light and belongings within reach.

## 2020-02-11 ENCOUNTER — APPOINTMENT (OUTPATIENT)
Dept: CT IMAGING | Age: 85
DRG: 280 | End: 2020-02-11
Attending: INTERNAL MEDICINE
Payer: MEDICARE

## 2020-02-11 ENCOUNTER — APPOINTMENT (OUTPATIENT)
Dept: GENERAL RADIOLOGY | Age: 85
DRG: 280 | End: 2020-02-11
Attending: INTERNAL MEDICINE
Payer: MEDICARE

## 2020-02-11 LAB
ANION GAP SERPL CALCULATED.3IONS-SCNC: 22 MMOL/L (ref 3–16)
BASE EXCESS ARTERIAL: -3 MMOL/L (ref -3–3)
BASE EXCESS ARTERIAL: -6.3 MMOL/L (ref -3–3)
BASOPHILS ABSOLUTE: 0 K/UL (ref 0–0.2)
BASOPHILS RELATIVE PERCENT: 0.4 %
BUN BLDV-MCNC: 19 MG/DL (ref 7–20)
CALCIUM SERPL-MCNC: 9 MG/DL (ref 8.3–10.6)
CARBOXYHEMOGLOBIN ARTERIAL: 1.2 % (ref 0–1.5)
CARBOXYHEMOGLOBIN ARTERIAL: 1.3 % (ref 0–1.5)
CHLORIDE BLD-SCNC: 102 MMOL/L (ref 99–110)
CO2: 17 MMOL/L (ref 21–32)
CREAT SERPL-MCNC: 1.4 MG/DL (ref 0.8–1.3)
EKG ATRIAL RATE: 131 BPM
EKG DIAGNOSIS: NORMAL
EKG P-R INTERVAL: 200 MS
EKG Q-T INTERVAL: 278 MS
EKG QRS DURATION: 90 MS
EKG QTC CALCULATION (BAZETT): 410 MS
EKG R AXIS: 21 DEGREES
EKG T AXIS: 192 DEGREES
EKG VENTRICULAR RATE: 131 BPM
EOSINOPHILS ABSOLUTE: 0.1 K/UL (ref 0–0.6)
EOSINOPHILS RELATIVE PERCENT: 2.9 %
GFR AFRICAN AMERICAN: 58
GFR NON-AFRICAN AMERICAN: 48
GLUCOSE BLD-MCNC: 110 MG/DL (ref 70–99)
GLUCOSE BLD-MCNC: 116 MG/DL (ref 70–99)
HCO3 ARTERIAL: 17 MMOL/L (ref 21–29)
HCO3 ARTERIAL: 21.9 MMOL/L (ref 21–29)
HCT VFR BLD CALC: 41.8 % (ref 40.5–52.5)
HCT VFR BLD CALC: 43.6 % (ref 40.5–52.5)
HEMATOLOGY PATH CONSULT: YES
HEMOGLOBIN, ART, EXTENDED: 12.6 G/DL (ref 13.5–17.5)
HEMOGLOBIN, ART, EXTENDED: 14.3 G/DL (ref 13.5–17.5)
HEMOGLOBIN: 14.2 G/DL (ref 13.5–17.5)
HEMOGLOBIN: 14.4 G/DL (ref 13.5–17.5)
LACTIC ACID, SEPSIS: 3.7 MMOL/L (ref 0.4–1.9)
LACTIC ACID: 3 MMOL/L (ref 0.4–2)
LYMPHOCYTES ABSOLUTE: 0.6 K/UL (ref 1–5.1)
LYMPHOCYTES RELATIVE PERCENT: 20.1 %
MCH RBC QN AUTO: 32.9 PG (ref 26–34)
MCH RBC QN AUTO: 33.4 PG (ref 26–34)
MCHC RBC AUTO-ENTMCNC: 33 G/DL (ref 31–36)
MCHC RBC AUTO-ENTMCNC: 34.1 G/DL (ref 31–36)
MCV RBC AUTO: 98.1 FL (ref 80–100)
MCV RBC AUTO: 99.7 FL (ref 80–100)
METHEMOGLOBIN ARTERIAL: 0.8 %
METHEMOGLOBIN ARTERIAL: 0.8 %
MONOCYTES ABSOLUTE: 0 K/UL (ref 0–1.3)
MONOCYTES RELATIVE PERCENT: 0.9 %
NEUTROPHILS ABSOLUTE: 2.2 K/UL (ref 1.7–7.7)
NEUTROPHILS RELATIVE PERCENT: 75.7 %
O2 CONTENT ARTERIAL: 17 ML/DL
O2 CONTENT ARTERIAL: 18 ML/DL
O2 SAT, ARTERIAL: 93.5 %
O2 SAT, ARTERIAL: 96.4 %
O2 THERAPY: ABNORMAL
O2 THERAPY: ABNORMAL
PCO2 ARTERIAL: 29.1 MMHG (ref 35–45)
PCO2 ARTERIAL: 40.9 MMHG (ref 35–45)
PDW BLD-RTO: 14.4 % (ref 12.4–15.4)
PDW BLD-RTO: 15.2 % (ref 12.4–15.4)
PERFORMED ON: ABNORMAL
PH ARTERIAL: 7.35 (ref 7.35–7.45)
PH ARTERIAL: 7.38 (ref 7.35–7.45)
PLATELET # BLD: 117 K/UL (ref 135–450)
PLATELET # BLD: 99 K/UL (ref 135–450)
PLATELET SLIDE REVIEW: ABNORMAL
PMV BLD AUTO: 7.5 FL (ref 5–10.5)
PMV BLD AUTO: 7.9 FL (ref 5–10.5)
PO2 ARTERIAL: 64.6 MMHG (ref 75–108)
PO2 ARTERIAL: 78.6 MMHG (ref 75–108)
POTASSIUM REFLEX MAGNESIUM: 4.2 MMOL/L (ref 3.5–5.1)
PROCALCITONIN: 83.63 NG/ML (ref 0–0.15)
RAPID INFLUENZA  B AGN: NEGATIVE
RAPID INFLUENZA A AGN: NEGATIVE
RBC # BLD: 4.26 M/UL (ref 4.2–5.9)
RBC # BLD: 4.37 M/UL (ref 4.2–5.9)
REPORT: NORMAL
SLIDE REVIEW: ABNORMAL
SODIUM BLD-SCNC: 141 MMOL/L (ref 136–145)
TCO2 ARTERIAL: 17.9 MMOL/L
TCO2 ARTERIAL: 23.2 MMOL/L
TROPONIN: 0.12 NG/ML
TROPONIN: 0.38 NG/ML
TROPONIN: 0.4 NG/ML
WBC # BLD: 1.8 K/UL (ref 4–11)
WBC # BLD: 2.9 K/UL (ref 4–11)

## 2020-02-11 PROCEDURE — 87299 ANTIBODY DETECTION NOS IF: CPT

## 2020-02-11 PROCEDURE — 84484 ASSAY OF TROPONIN QUANT: CPT

## 2020-02-11 PROCEDURE — 71045 X-RAY EXAM CHEST 1 VIEW: CPT

## 2020-02-11 PROCEDURE — 87280 RESPIRATORY SYNCYTIAL AG IF: CPT

## 2020-02-11 PROCEDURE — 2580000003 HC RX 258: Performed by: INTERNAL MEDICINE

## 2020-02-11 PROCEDURE — 82803 BLOOD GASES ANY COMBINATION: CPT

## 2020-02-11 PROCEDURE — 87276 INFLUENZA A AG IF: CPT

## 2020-02-11 PROCEDURE — 80048 BASIC METABOLIC PNL TOTAL CA: CPT

## 2020-02-11 PROCEDURE — 87275 INFLUENZA B AG IF: CPT

## 2020-02-11 PROCEDURE — 84145 PROCALCITONIN (PCT): CPT

## 2020-02-11 PROCEDURE — 87040 BLOOD CULTURE FOR BACTERIA: CPT

## 2020-02-11 PROCEDURE — 2700000000 HC OXYGEN THERAPY PER DAY

## 2020-02-11 PROCEDURE — 99233 SBSQ HOSP IP/OBS HIGH 50: CPT | Performed by: INTERNAL MEDICINE

## 2020-02-11 PROCEDURE — 1200000000 HC SEMI PRIVATE

## 2020-02-11 PROCEDURE — 6360000002 HC RX W HCPCS: Performed by: INTERNAL MEDICINE

## 2020-02-11 PROCEDURE — 6370000000 HC RX 637 (ALT 250 FOR IP): Performed by: INTERNAL MEDICINE

## 2020-02-11 PROCEDURE — 87279 PARAINFLUENZA AG IF: CPT

## 2020-02-11 PROCEDURE — 2500000003 HC RX 250 WO HCPCS: Performed by: INTERNAL MEDICINE

## 2020-02-11 PROCEDURE — 2580000003 HC RX 258

## 2020-02-11 PROCEDURE — 6360000002 HC RX W HCPCS

## 2020-02-11 PROCEDURE — 94664 DEMO&/EVAL PT USE INHALER: CPT

## 2020-02-11 PROCEDURE — 87260 ADENOVIRUS AG IF: CPT

## 2020-02-11 PROCEDURE — 87086 URINE CULTURE/COLONY COUNT: CPT

## 2020-02-11 PROCEDURE — 93005 ELECTROCARDIOGRAM TRACING: CPT | Performed by: INTERNAL MEDICINE

## 2020-02-11 PROCEDURE — 85027 COMPLETE CBC AUTOMATED: CPT

## 2020-02-11 PROCEDURE — 71250 CT THORAX DX C-: CPT

## 2020-02-11 PROCEDURE — 83605 ASSAY OF LACTIC ACID: CPT

## 2020-02-11 PROCEDURE — 94760 N-INVAS EAR/PLS OXIMETRY 1: CPT

## 2020-02-11 PROCEDURE — 85025 COMPLETE CBC W/AUTO DIFF WBC: CPT

## 2020-02-11 PROCEDURE — 87150 DNA/RNA AMPLIFIED PROBE: CPT

## 2020-02-11 PROCEDURE — 93010 ELECTROCARDIOGRAM REPORT: CPT | Performed by: INTERNAL MEDICINE

## 2020-02-11 PROCEDURE — 36600 WITHDRAWAL OF ARTERIAL BLOOD: CPT

## 2020-02-11 PROCEDURE — 36415 COLL VENOUS BLD VENIPUNCTURE: CPT

## 2020-02-11 PROCEDURE — 87186 SC STD MICRODIL/AGAR DIL: CPT

## 2020-02-11 RX ORDER — SODIUM CHLORIDE 9 MG/ML
INJECTION, SOLUTION INTRAVENOUS CONTINUOUS
Status: DISCONTINUED | OUTPATIENT
Start: 2020-02-11 | End: 2020-02-12

## 2020-02-11 RX ORDER — PRAVASTATIN SODIUM 40 MG
40 TABLET ORAL NIGHTLY
Status: DISCONTINUED | OUTPATIENT
Start: 2020-02-11 | End: 2020-02-16 | Stop reason: HOSPADM

## 2020-02-11 RX ORDER — ASPIRIN 81 MG/1
81 TABLET ORAL DAILY
Status: DISCONTINUED | OUTPATIENT
Start: 2020-02-11 | End: 2020-02-16 | Stop reason: HOSPADM

## 2020-02-11 RX ORDER — POTASSIUM CHLORIDE 750 MG/1
10 CAPSULE, EXTENDED RELEASE ORAL DAILY
COMMUNITY

## 2020-02-11 RX ORDER — SODIUM CHLORIDE 0.9 % (FLUSH) 0.9 %
10 SYRINGE (ML) INJECTION PRN
Status: DISCONTINUED | OUTPATIENT
Start: 2020-02-11 | End: 2020-02-16 | Stop reason: HOSPADM

## 2020-02-11 RX ORDER — LABETALOL HYDROCHLORIDE 5 MG/ML
10 INJECTION, SOLUTION INTRAVENOUS EVERY 6 HOURS PRN
Status: DISCONTINUED | OUTPATIENT
Start: 2020-02-11 | End: 2020-02-16 | Stop reason: HOSPADM

## 2020-02-11 RX ORDER — ACETAMINOPHEN 325 MG/1
650 TABLET ORAL EVERY 4 HOURS PRN
Status: DISCONTINUED | OUTPATIENT
Start: 2020-02-11 | End: 2020-02-11 | Stop reason: SDUPTHER

## 2020-02-11 RX ORDER — METOPROLOL TARTRATE 5 MG/5ML
5 INJECTION INTRAVENOUS EVERY 6 HOURS
Status: DISCONTINUED | OUTPATIENT
Start: 2020-02-11 | End: 2020-02-12

## 2020-02-11 RX ORDER — DILTIAZEM HYDROCHLORIDE 60 MG/1
90 TABLET, FILM COATED ORAL 2 TIMES DAILY
Status: DISCONTINUED | OUTPATIENT
Start: 2020-02-11 | End: 2020-02-12

## 2020-02-11 RX ORDER — 0.9 % SODIUM CHLORIDE 0.9 %
1000 INTRAVENOUS SOLUTION INTRAVENOUS ONCE
Status: COMPLETED | OUTPATIENT
Start: 2020-02-11 | End: 2020-02-11

## 2020-02-11 RX ORDER — PRAVASTATIN SODIUM 40 MG
40 TABLET ORAL DAILY
COMMUNITY

## 2020-02-11 RX ORDER — METOPROLOL TARTRATE 50 MG/1
50 TABLET, FILM COATED ORAL 2 TIMES DAILY
Status: DISCONTINUED | OUTPATIENT
Start: 2020-02-11 | End: 2020-02-11

## 2020-02-11 RX ORDER — SODIUM CHLORIDE 9 MG/ML
30 INJECTION, SOLUTION INTRAVENOUS ONCE
Status: DISCONTINUED | OUTPATIENT
Start: 2020-02-11 | End: 2020-02-11

## 2020-02-11 RX ORDER — FUROSEMIDE 20 MG/1
20 TABLET ORAL DAILY
COMMUNITY

## 2020-02-11 RX ORDER — SODIUM CHLORIDE 0.9 % (FLUSH) 0.9 %
10 SYRINGE (ML) INJECTION EVERY 12 HOURS SCHEDULED
Status: DISCONTINUED | OUTPATIENT
Start: 2020-02-11 | End: 2020-02-16 | Stop reason: HOSPADM

## 2020-02-11 RX ORDER — IBUPROFEN 600 MG/1
600 TABLET ORAL EVERY 6 HOURS PRN
Status: DISCONTINUED | OUTPATIENT
Start: 2020-02-11 | End: 2020-02-12

## 2020-02-11 RX ORDER — GABAPENTIN 600 MG/1
600 TABLET ORAL 2 TIMES DAILY
COMMUNITY

## 2020-02-11 RX ORDER — HYDRALAZINE HYDROCHLORIDE 20 MG/ML
10 INJECTION INTRAMUSCULAR; INTRAVENOUS ONCE
Status: COMPLETED | OUTPATIENT
Start: 2020-02-11 | End: 2020-02-11

## 2020-02-11 RX ORDER — HYDROCODONE BITARTRATE AND ACETAMINOPHEN 5; 325 MG/1; MG/1
1 TABLET ORAL 2 TIMES DAILY PRN
COMMUNITY

## 2020-02-11 RX ADMIN — ACETAMINOPHEN 650 MG: 325 TABLET ORAL at 20:03

## 2020-02-11 RX ADMIN — VANCOMYCIN HYDROCHLORIDE 1250 MG: 10 INJECTION, POWDER, LYOPHILIZED, FOR SOLUTION INTRAVENOUS at 07:44

## 2020-02-11 RX ADMIN — IBUPROFEN 600 MG: 600 TABLET, FILM COATED ORAL at 03:51

## 2020-02-11 RX ADMIN — ACETAMINOPHEN 650 MG: 325 TABLET ORAL at 02:31

## 2020-02-11 RX ADMIN — ONDANSETRON 4 MG: 2 INJECTION INTRAMUSCULAR; INTRAVENOUS at 03:17

## 2020-02-11 RX ADMIN — SODIUM CHLORIDE: 9 INJECTION, SOLUTION INTRAVENOUS at 19:01

## 2020-02-11 RX ADMIN — SODIUM CHLORIDE, PRESERVATIVE FREE 10 ML: 5 INJECTION INTRAVENOUS at 20:30

## 2020-02-11 RX ADMIN — CEFEPIME HYDROCHLORIDE 1 G: 1 INJECTION, POWDER, FOR SOLUTION INTRAMUSCULAR; INTRAVENOUS at 16:28

## 2020-02-11 RX ADMIN — METOPROLOL TARTRATE 5 MG: 5 INJECTION, SOLUTION INTRAVENOUS at 05:54

## 2020-02-11 RX ADMIN — ACETAMINOPHEN 650 MG: 325 TABLET ORAL at 06:31

## 2020-02-11 RX ADMIN — CEFEPIME HYDROCHLORIDE 1 G: 1 INJECTION, POWDER, FOR SOLUTION INTRAMUSCULAR; INTRAVENOUS at 03:37

## 2020-02-11 RX ADMIN — HYDRALAZINE HYDROCHLORIDE 10 MG: 20 INJECTION INTRAMUSCULAR; INTRAVENOUS at 02:43

## 2020-02-11 RX ADMIN — SODIUM CHLORIDE: 9 INJECTION, SOLUTION INTRAVENOUS at 16:30

## 2020-02-11 RX ADMIN — SODIUM CHLORIDE 1000 ML: 9 INJECTION, SOLUTION INTRAVENOUS at 18:44

## 2020-02-11 RX ADMIN — PRAVASTATIN SODIUM 40 MG: 40 TABLET ORAL at 20:29

## 2020-02-11 ASSESSMENT — PAIN DESCRIPTION - ORIENTATION
ORIENTATION: RIGHT

## 2020-02-11 ASSESSMENT — PAIN DESCRIPTION - LOCATION
LOCATION: KNEE

## 2020-02-11 ASSESSMENT — PAIN DESCRIPTION - PAIN TYPE
TYPE: ACUTE PAIN

## 2020-02-11 ASSESSMENT — PAIN DESCRIPTION - PROGRESSION
CLINICAL_PROGRESSION: NOT CHANGED

## 2020-02-11 ASSESSMENT — PAIN DESCRIPTION - ONSET
ONSET: UNABLE TO TELL

## 2020-02-11 ASSESSMENT — PAIN DESCRIPTION - FREQUENCY
FREQUENCY: INTERMITTENT

## 2020-02-11 ASSESSMENT — PAIN SCALES - GENERAL
PAINLEVEL_OUTOF10: 1
PAINLEVEL_OUTOF10: 0
PAINLEVEL_OUTOF10: 3
PAINLEVEL_OUTOF10: 0
PAINLEVEL_OUTOF10: 3
PAINLEVEL_OUTOF10: 0

## 2020-02-11 ASSESSMENT — PAIN DESCRIPTION - DESCRIPTORS
DESCRIPTORS: ACHING

## 2020-02-11 ASSESSMENT — PAIN - FUNCTIONAL ASSESSMENT
PAIN_FUNCTIONAL_ASSESSMENT: PREVENTS OR INTERFERES SOME ACTIVE ACTIVITIES AND ADLS

## 2020-02-11 NOTE — PROGRESS NOTES
Prn advil given as ordered. Will continue to monitor and assess.  Electronically signed by Betty Garnett RN on 2/11/2020 at 3:55 AM

## 2020-02-11 NOTE — PROGRESS NOTES
Iv access . Patient's iv fluids infusing as ordered. Pt assessed for pain. Pt denies any pain at this time. Will continue to monitor pt and assess for pain throughout rest of shift. Right angela shows no signs of bleeding, hemotoma, or infection. Fall risk assessment completed. Fall precautions in place. Call light within reach. Pt educated on calling for assistance before getting up. Walkway free of clutter. Will continue to monitor.    Electronically signed by Renee Mckeon RN on 2/11/2020 at 2:02 AM

## 2020-02-11 NOTE — CONSULTS
Hospital Medicine  Consult History & Physical        Chief Complaint: AMS    Date of Service: Pt seen/examined in consultation on 2/11/2020    History Of Present Illness:      80 y.o. male who we are asked to see/evaluate by Ulices Whitley MD for medical management of fever/AMS. Pt apparently presented to [facility] for syncope and elevated trops and transferred to [address] [place] [address],[address] for cardiology eval. Pt underwent cath. Developed fever with AMS early today and medicine consulted for further work-up. Past Medical History:        Diagnosis Date    Arthritis     Basal ganglia infarction [facility])     pt unaware of this    Bladder [facility] ([facility] [address]     Cataract     Chronic ethmoidal sinusitis     COPD [facility]ch[facility] obstructive [facility] disease) ([facility])     Diabetes mellitus (HCC)     pre-diabetic-no meds    Gas pain     GERD (gastroesophageal reflux disease)     History of nephrectomy     History of renal calculi     HTN (hypertension)     Hyperinflation of lungs     Hyperlipidemia     Hypoplasia of one kidney     Seizures ([facility])     last seizure-6-7 months ago    Sleep disorder     Squamous cell carcinoma of skin of right arm, including shoulder     Trigeminal neuralgia        Past Surgical History:        Procedure Laterality Date    ABDOMINAL AORTIC ANEURYSM REPAIR, ENDOVASCULAR  4/27/16    BLADDER TUMOR EXCISION      CATARACT REMOVAL WITH IMPLANT Bilateral     HERNIA REPAIR      NEPHROSTOMY      SKIN CANCER EXCISION  10/15/2015    squamous    TOTAL NEPHRECTOMY Left 1994       Medications Prior to Admission:    Prior to Admission medications    Medication Sig Start Date End Date Taking? Authorizing Provider   furosemide (LASIX) 20 MG tablet Take 20 mg by mouth daily   Yes Historical Provider, MD   gabapentin (NEURONTIN) 600 MG tablet Take 600 mg by mouth 2 times daily.    Yes Historical Provider, MD   HYDROcodone-acetaminophen (NORCO) 5-325 MG per tablet Take 1 tablet by mouth 2 times daily as needed distention. Trachea midline. Respiratory:  Diminished b/l  Cardiovascular: Regular rate and rhythm with normal S1/S2   Abdomen: Soft, non-tender, non-distended with normal bowel sounds. Musculoskeletal: No clubbing, cyanosis or edema bilaterally. Skin: Skin color, texture, turgor normal.  No rashes or lesions. Neurologic:  Lethargic, grossly non focal  Psychiatric: lethargic  Capillary Refill: Brisk,< 3 seconds   Peripheral Pulses: +2 palpable, equal bilaterally     Labs:     Recent Labs     02/11/20  0321 02/11/20  0500   WBC 2.9* 1.8*   HGB 14.4 14.2   HCT 43.6 41.8   * 99*     Recent Labs     02/11/20  0321      K 4.2      CO2 17*   BUN 19   CREATININE 1.4*   CALCIUM 9.0     No results for input(s): AST, ALT, BILIDIR, BILITOT, ALKPHOS in the last 72 hours. No results for input(s): INR in the last 72 hours. Recent Labs     02/11/20  0321 02/11/20  0928   TROPONINI 0.12* 0.38*       Urinalysis:  No results found for: Alan Labella, BACTERIA, RBCUA, BLOODU, SPECGRAV, GLUCOSEU    Radiology: I have reviewed the radiology reports with the following interpretation:     CT CHEST WO CONTRAST   Final Result   Trace bilateral pleural effusions. Patchy ground-glass opacity bilaterally   can be on the basis of atelectasis, mild edema, or pneumonitis. Sequela of granulomatous disease. Several noncalcified pulmonary nodules are   also seen which are technically indeterminate, the largest within the right   perihilar region measuring approximately 1.5 cm. Enlarged AP window lymph   node is also seen. Continued follow-up after treatment and resolution of any   acute symptoms is recommended. XR CHEST PORTABLE   Final Result   Cardiomegaly and mild pulmonary vascular congestion.                   ASSESSMENT:    Active Hospital Problems    Diagnosis Date Noted    Mental status change resolved [Z86.59] 02/10/2020       PLAN:    NSTEMI - cardio following, s/p cath with occlusion but no PCI performed, medical mgmt  Sepsis/Fever - suspect 2/2 UTI, check CT chest/UA/blood cx, started on cefepime/vanco, blood cx with e coli, suspect from UTI  HTN - controlled - currently hypotensive, place parameters on BP meds  Suspected early dementia/acute metabolic encephalopathy - suspect 2/2 above, monitor      DVT Prophylaxis: SCD  Diet: DIET CARDIAC;  Code Status: Full Code    PT/OT Eval Status: Active and ongoing    Dispo - cont care    Thank you for the consultation, will follow up as needed    Karyle Anon MD

## 2020-02-11 NOTE — PROGRESS NOTES
Urine set to lab, nose swab sent to lab, ABG complete and CT complete. Patient is in bed eyes closed call light in reach.

## 2020-02-11 NOTE — PROGRESS NOTES
Patients dressing clean dry and intact in right groin. Patient denies any pain. Patient on room air. Fall risk assessment completed. Fall precautions in place. Call light within reach. Pt educated on calling for assistance before getting up. Walkway free of clutter. Will continue to monitor.    Electronically signed by Chantale Barajas RN on 2/10/2020 at 7:35 PM

## 2020-02-11 NOTE — PROGRESS NOTES
Patient sinus tachycardic 130's . Prn hydralazine given for high blood pressure . Patient incontinent of urine. Warm blanket applied. Bed alarm in place.  Electronically signed by Alyssa De La Paz RN on 2/11/2020 at 2:46 AM

## 2020-02-11 NOTE — PROGRESS NOTES
[address]  Admission H & P    CC: Syncope/abnormal EKG               Douglas Alfredo, APRN - CNP:           HPI:   This is a 80 y.o. male with history of abdominal aortic aneurysm repair, seizure disorder, diabetes, nephrectomy bladder cancer who came to [facility] with a questionable syncopal episode. The patient denies it he says he deftly did not have a seizure. His initial EKG shows old inferior infarct with Q waves in the inferior leads with some slight ST elevations. There were also nonspecific ST depressions throughout. Troponins are just borderline elevated 0.11. He never had chest pain and continues to deny it. Looks very old but states that he is mobile and can walk a mile.     Interval history  Apparently the patient became tachycardic febrile with a temperature last night at 3 AM  He was hypotensive and given a bunch of antihypertensive drugs  Currently sleeping barely arousable; blood pressure is   Got labetalol hydralazine Zofran last night; also is on antibiotic      Past Medical History:   Diagnosis Date    Arthritis     Basal ganglia infarction [facility])     pt unaware of this    Bladder cancer ([facility] [address]     Cataract     Chronic ethmoidal sinusitis     COPD (chronic obstructive pulmonary disease) ([facility]     Diabetes mellitus (HCC)     pre-diabetic-no meds    Gas pain     GERD (gastroesophageal reflux disease)     History of nephrectomy     History of renal calculi     HTN (hypertension)     Hyperinflation of lungs     Hyperlipidemia     Hypoplasia of one kidney     Seizures (HCC)     last seizure-6-7 months ago    Sleep disorder     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 No pertinent family history.      Social History     Tobacco Use    Smoking status: Former Smoker     Packs/day: 0.25     Years: 70.00     Pack years: 17.50     Last attempt to quit: 7/31/2016     Years since quitting: 3.5    Smokeless tobacco: Former User    Tobacco comment: quite 03/28/15    Substance Use Topics    Alcohol use: No    Drug use: No        No Known Allergies          Review of Systems -   Constitutional: Negative for weight gain/loss; malaise, fever  Respiratory: Negative for Asthma;  cough and hemoptysis  Cardiovascular: Negative for palpitations,dizziness   Gastrointestinal: Negative for abd.pain; constipation/diarrhea;    Genitourinary: Negative for stones; hematuria; frequency hesitancy  Integumentt: Negative for rash or pruritis  Hematologic/lymphatic: Negative for blood dyscrasia; leukemia/lymphoma  Musculoskeletal: Negative for Connective tissue disease  Neurological:  Negative for Seizure   Behavioral/Psych:Negative for Bipolar disorder, Schizophrenia; Dementia  Endocrine: negative for thyroid, parathyroid disease      Intake/Output Summary (Last 24 hours) at 2/11/2020 1313  Last data filed at 2/10/2020 2155  Gross per 24 hour   Intake 120 ml   Output 200 ml   Net -80 ml        Physical Examination:    BP (!) 95/53   Pulse 88   Temp 97.9 °F (36.6 °C) (Axillary)   Resp 20   Ht 5' 8\" (1.727 m)   Wt 179 lb 7.3 oz (81.4 kg)   SpO2 94%   BMI 27.29 kg/m²    Vitals:    02/11/20 0619 02/11/20 1046 02/11/20 1109 02/11/20 1245   BP: 108/69 99/62  (!) 95/53   Pulse: 94 95  88   Resp: 20 20 20 20   Temp: 101.9 °F (38.8 °C) 98.5 °F (36.9 °C)  97.9 °F (36.6 °C)   TempSrc: Axillary Oral  Axillary   SpO2: 90% 94% 91% 94%   Weight:       Height:         Wt Readings from Last 3 Encounters:   02/11/20 179 lb 7.3 oz (81.4 kg)   01/18/18 201 lb (91.2 kg)   10/26/17 205 lb (93 kg)      BP Readings from Last 3 Encounters:   02/11/20 (!) 95/53   01/18/18 124/70   10/26/17 126/70         HEENT:  Face: Atraumatic, Conjunctiva: Pink; non icteric,  Mucous Memb:  Moist, No thyromegaly or Lymphadenopathy  Respiratory:  Resp Assessment: normal, Resp Auscultation: clear   Cardiovascular: Auscultation: nl S1 & S2, Palpation:  Nl PMI; No heaves or thrills, JVP:  normal  Abdomen: Soft, non-tender, Normal bowel sounds,  No organomegaly  Extremities: No Cyanosis or Clubbing  Neurological: Oriented to time, place, and person, Non-anxious  Psychiatric: Normal mood and affect  Skin: Warm and dry,  No rash seen    Lab Results   Component Value Date    HDL 35 01/27/2017    LDLDIRECT 109 10/31/2013    LDLCALC 67 01/27/2017    TRIG 233 01/27/2017     No results for input(s): AST, ALT, LABALBU in the last 72 hours. EKG:   Sinus rhythm with possible old inferior infarct and nonspecific ST depressions    Coronary angiography: 2/11/2020  Subtotal occlusion of codominant RCA in the mid segment  Left circulation is normal with no obstructive disease  Normal LV size and function      Summary  1. This is a codominant circulation. The arteries are calcified. 2.  Left main is free of disease. 3.  The LAD is large and free of disease. 4.  The circumflex artery is codominant and is free of disease. 5.  The right coronary artery in the mid segment is heavily calcified  and appears to have a subtotal lesion with very faint antegrade flow. It is hard to say whether this is new or old. The PDA branch has  collaterals from the left circulation and some antegrade flow.     CONCLUSION:  Single-vessel coronary artery disease with subtotal  occlusion of mid right coronary artery. This is a codominant vessel  with collaterals from the left circulation. It is not certain whether  this is new or old. Nevertheless, given his limited functional  capacity, single kidney with a creatinine 1.3, we decided to treat him  medically.   He should do

## 2020-02-11 NOTE — PROGRESS NOTES
[address]  Inpatient Progress Note    CC: Syncope/abnormal EKG                Vadim Fofana, APRN - CNP:               HPI:   This is a 80 y.o. male with history of abdominal aortic aneurysm repair, seizure disorder, diabetes, nephrectomy bladder cancer who came [facility] with a questionable syncopal episode. The patient denies it he says he deftly did not have a seizure. His initial EKG shows old inferior infarct with Q waves in the inferior leads with some slight ST elevations. There were also nonspecific ST depressions throughout. Troponins are just borderline elevated 0.11. He never had chest pain and continues to deny it. Looks very old but states that he is mobile and can walk a mile.             Review of Systems -   Constitutional: Negative for weight gain/loss; malaise, fever  Respiratory: Negative for Asthma;  cough and hemoptysis  Cardiovascular: Negative for palpitations,dizziness   Gastrointestinal: Negative for abd.pain; constipation/diarrhea;    Genitourinary: Negative for stones; hematuria; frequency hesitancy  Integumentt: Negative for rash or pruritis  Hematologic/lymphatic: Negative for blood dyscrasia; leukemia/lymphoma  Musculoskeletal: Negative for Connective tissue disease  Neurological:  Negative for Seizure   Behavioral/Psych:Negative for Bipolar disorder, Schizophrenia; Dementia  Endocrine: negative for thyroid, parathyroid disease      Intake/Output Summary (Last 24 hours) at 2/11/2020 0917  Last data filed at 2/10/2020 2155  Gross per 24 hour   Intake 120 ml   Output 200 ml   Net -80 ml         Physical Examination:    /69   Pulse 94   Temp 101.9 °F (38.8 °C) (Axillary)   Resp 20   Ht 5' 8\" (1.727 m)   Wt 179 lb 7.3 oz (81.4 kg)   SpO2 90%   BMI 27.29 kg/m²   HEENT:  Face: Atraumatic, Conjunctiva: Pink; non icteric,  Mucous Memb:  Moist, No thyromegaly or Lymphadenopathy  Respiratory:  Resp Assessment: normal, Resp Auscultation: clear Cardiovascular: Auscultation: nl S1 & S2, Palpation:  Nl PMI; No heaves or thrills, JVP:  normal  Abdomen: Soft, non-tender, Normal bowel sounds,  No organomegaly  Extremities: No Cyanosis or Clubbing; Edema none  Neurological: Oriented to time, place, and person, Non-anxious  Psychiatric: Normal mood and affect  Skin: Warm and dry,  No rash seen    Current Facility-Administered Medications: labetalol (NORMODYNE;TRANDATE) injection 10 mg, 10 mg, Intravenous, Q6H PRN  cefepime (MAXIPIME) 1 g IVPB minibag, 1 g, Intravenous, Q12H  pravastatin (PRAVACHOL) tablet 40 mg, 40 mg, Oral, Nightly  aspirin EC tablet 81 mg, 81 mg, Oral, Daily  diltiazem (CARDIZEM) tablet 90 mg, 90 mg, Oral, BID  metoprolol tartrate (LOPRESSOR) tablet 50 mg, 50 mg, Oral, BID  ibuprofen (ADVIL;MOTRIN) tablet 600 mg, 600 mg, Oral, Q6H PRN  metoprolol (LOPRESSOR) injection 5 mg, 5 mg, Intravenous, Q6H  vancomycin (VANCOCIN) intermittent dosing (placeholder), , Other, RX Placeholder  sodium chloride flush 0.9 % injection 10 mL, 10 mL, Intravenous, 2 times per day  sodium chloride flush 0.9 % injection 10 mL, 10 mL, Intravenous, PRN  acetaminophen (TYLENOL) tablet 650 mg, 650 mg, Oral, Q4H PRN  0.9 % sodium chloride bolus, 500 mL, Intravenous, PRN  magnesium hydroxide (MILK OF MAGNESIA) 400 MG/5ML suspension 30 mL, 30 mL, Oral, Daily PRN  ondansetron (ZOFRAN) injection 4 mg, 4 mg, Intravenous, Q6H PRN  0.9 % sodium chloride infusion, , Intravenous, Continuous         Labs:   Recent Labs     02/11/20  0321 02/11/20  0500   WBC 2.9* 1.8*   HGB 14.4 14.2   HCT 43.6 41.8   * 99*     Recent Labs     02/11/20  0321      K 4.2   CO2 17*   BUN 19   CREATININE 1.4*   LABGLOM 48*     No results for input(s): BNP in the last 72 hours. No results for input(s): PROTIME, INR in the last 72 hours.   Recent Labs     02/11/20  0321   TROPONINI 0.12*         EKG:   Sinus rhythm with possible old inferior infarct and nonspecific ST depressions

## 2020-02-11 NOTE — CARE COORDINATION
LSW met with patients spouse and family at bedside. Patient sleeping soundly. Per spouse PTA patient used cane to ambulate. Per spouse patient has O2 at home but does not wear it. Family provides transportation for patient and spouse. Per spouse patient did not have any services PTA. LSW following and will assist with discharge planning when medically stable.    Electronically signed by Radhika Weber on 2/11/2020 at 4:06 PM

## 2020-02-12 LAB
ALBUMIN SERPL-MCNC: 2.9 G/DL (ref 3.4–5)
ALP BLD-CCNC: 58 U/L (ref 40–129)
ALT SERPL-CCNC: 14 U/L (ref 10–40)
AMORPHOUS: ABNORMAL /HPF
ANION GAP SERPL CALCULATED.3IONS-SCNC: 15 MMOL/L (ref 3–16)
AST SERPL-CCNC: 23 U/L (ref 15–37)
BACTERIA: ABNORMAL /HPF
BANDED NEUTROPHILS RELATIVE PERCENT: 16 % (ref 0–7)
BASOPHILS ABSOLUTE: 0 K/UL (ref 0–0.2)
BASOPHILS RELATIVE PERCENT: 0 %
BILIRUB SERPL-MCNC: 0.4 MG/DL (ref 0–1)
BILIRUBIN DIRECT: <0.2 MG/DL (ref 0–0.3)
BILIRUBIN URINE: NEGATIVE
BILIRUBIN, INDIRECT: ABNORMAL MG/DL (ref 0–1)
BLOOD, URINE: ABNORMAL
BUN BLDV-MCNC: 38 MG/DL (ref 7–20)
CALCIUM SERPL-MCNC: 8.6 MG/DL (ref 8.3–10.6)
CHLORIDE BLD-SCNC: 105 MMOL/L (ref 99–110)
CLARITY: ABNORMAL
CO2: 20 MMOL/L (ref 21–32)
COLOR: ABNORMAL
CREAT SERPL-MCNC: 2.3 MG/DL (ref 0.8–1.3)
CREATININE URINE: 188.1 MG/DL (ref 39–259)
EOSINOPHILS ABSOLUTE: 0 K/UL (ref 0–0.6)
EOSINOPHILS RELATIVE PERCENT: 0 %
EPITHELIAL CELLS, UA: 2 /HPF (ref 0–5)
GFR AFRICAN AMERICAN: 33
GFR NON-AFRICAN AMERICAN: 27
GLUCOSE BLD-MCNC: 236 MG/DL (ref 70–99)
GLUCOSE BLD-MCNC: 95 MG/DL (ref 70–99)
GLUCOSE URINE: NEGATIVE MG/DL
HCT VFR BLD CALC: 38.8 % (ref 40.5–52.5)
HEMATOLOGY PATH CONSULT: NORMAL
HEMOGLOBIN: 12.9 G/DL (ref 13.5–17.5)
HYALINE CASTS: 10 /LPF (ref 0–8)
KETONES, URINE: ABNORMAL MG/DL
LACTIC ACID: 2.4 MMOL/L (ref 0.4–2)
LEUKOCYTE ESTERASE, URINE: NEGATIVE
LYMPHOCYTES ABSOLUTE: 0.6 K/UL (ref 1–5.1)
LYMPHOCYTES RELATIVE PERCENT: 3 %
MAGNESIUM: 1.7 MG/DL (ref 1.8–2.4)
MCH RBC QN AUTO: 33 PG (ref 26–34)
MCHC RBC AUTO-ENTMCNC: 33.3 G/DL (ref 31–36)
MCV RBC AUTO: 99.1 FL (ref 80–100)
METAMYELOCYTES RELATIVE PERCENT: 1 %
MICROSCOPIC EXAMINATION: YES
MONOCYTES ABSOLUTE: 1.3 K/UL (ref 0–1.3)
MONOCYTES RELATIVE PERCENT: 7 %
MYELOCYTE PERCENT: 1 %
NEUTROPHILS ABSOLUTE: 17.3 K/UL (ref 1.7–7.7)
NEUTROPHILS RELATIVE PERCENT: 72 %
NITRITE, URINE: NEGATIVE
PDW BLD-RTO: 15.4 % (ref 12.4–15.4)
PERFORMED ON: ABNORMAL
PH UA: 5 (ref 5–8)
PHOSPHORUS: 4.8 MG/DL (ref 2.5–4.9)
PLATELET # BLD: 62 K/UL (ref 135–450)
PLATELET SLIDE REVIEW: ABNORMAL
PMV BLD AUTO: 8.9 FL (ref 5–10.5)
POTASSIUM SERPL-SCNC: 4.3 MMOL/L (ref 3.5–5.1)
PROTEIN UA: 100 MG/DL
RBC # BLD: 3.91 M/UL (ref 4.2–5.9)
RBC # BLD: NORMAL 10*6/UL
RBC UA: 2 /HPF (ref 0–4)
SLIDE REVIEW: ABNORMAL
SODIUM BLD-SCNC: 140 MMOL/L (ref 136–145)
SPECIFIC GRAVITY UA: 1.03 (ref 1–1.03)
TOTAL PROTEIN: 6.3 G/DL (ref 6.4–8.2)
TOXIC GRANULATION: PRESENT
UREA NITROGEN, UR: 610.4 MG/DL (ref 800–1666)
URINE CULTURE, ROUTINE: NORMAL
URINE TYPE: ABNORMAL
UROBILINOGEN, URINE: 0.2 E.U./DL
VACUOLATED NEUTROPHILS: PRESENT
WBC # BLD: 19.2 K/UL (ref 4–11)
WBC UA: 8 /HPF (ref 0–5)

## 2020-02-12 PROCEDURE — 87086 URINE CULTURE/COLONY COUNT: CPT

## 2020-02-12 PROCEDURE — 83605 ASSAY OF LACTIC ACID: CPT

## 2020-02-12 PROCEDURE — 6370000000 HC RX 637 (ALT 250 FOR IP): Performed by: INTERNAL MEDICINE

## 2020-02-12 PROCEDURE — 2580000003 HC RX 258: Performed by: INTERNAL MEDICINE

## 2020-02-12 PROCEDURE — 1200000000 HC SEMI PRIVATE

## 2020-02-12 PROCEDURE — 6370000000 HC RX 637 (ALT 250 FOR IP): Performed by: HOSPITALIST

## 2020-02-12 PROCEDURE — 36415 COLL VENOUS BLD VENIPUNCTURE: CPT

## 2020-02-12 PROCEDURE — 6360000002 HC RX W HCPCS: Performed by: INTERNAL MEDICINE

## 2020-02-12 PROCEDURE — 80048 BASIC METABOLIC PNL TOTAL CA: CPT

## 2020-02-12 PROCEDURE — 85025 COMPLETE CBC W/AUTO DIFF WBC: CPT

## 2020-02-12 PROCEDURE — 84540 ASSAY OF URINE/UREA-N: CPT

## 2020-02-12 PROCEDURE — 80076 HEPATIC FUNCTION PANEL: CPT

## 2020-02-12 PROCEDURE — 87040 BLOOD CULTURE FOR BACTERIA: CPT

## 2020-02-12 PROCEDURE — 99233 SBSQ HOSP IP/OBS HIGH 50: CPT | Performed by: INTERNAL MEDICINE

## 2020-02-12 PROCEDURE — 84100 ASSAY OF PHOSPHORUS: CPT

## 2020-02-12 PROCEDURE — 82570 ASSAY OF URINE CREATININE: CPT

## 2020-02-12 PROCEDURE — 81001 URINALYSIS AUTO W/SCOPE: CPT

## 2020-02-12 PROCEDURE — 83735 ASSAY OF MAGNESIUM: CPT

## 2020-02-12 RX ORDER — METOPROLOL SUCCINATE 50 MG/1
50 TABLET, EXTENDED RELEASE ORAL DAILY
Status: DISCONTINUED | OUTPATIENT
Start: 2020-02-12 | End: 2020-02-16 | Stop reason: HOSPADM

## 2020-02-12 RX ORDER — LANOLIN ALCOHOL/MO/W.PET/CERES
400 CREAM (GRAM) TOPICAL 2 TIMES DAILY
Status: COMPLETED | OUTPATIENT
Start: 2020-02-12 | End: 2020-02-13

## 2020-02-12 RX ORDER — SODIUM CHLORIDE 9 MG/ML
INJECTION, SOLUTION INTRAVENOUS CONTINUOUS
Status: DISCONTINUED | OUTPATIENT
Start: 2020-02-12 | End: 2020-02-13

## 2020-02-12 RX ADMIN — DILTIAZEM HYDROCHLORIDE 90 MG: 60 TABLET, FILM COATED ORAL at 09:20

## 2020-02-12 RX ADMIN — Medication 400 MG: at 20:09

## 2020-02-12 RX ADMIN — PRAVASTATIN SODIUM 40 MG: 40 TABLET ORAL at 20:09

## 2020-02-12 RX ADMIN — Medication 400 MG: at 16:14

## 2020-02-12 RX ADMIN — ASPIRIN 81 MG: 81 TABLET, COATED ORAL at 09:19

## 2020-02-12 RX ADMIN — PIPERACILLIN AND TAZOBACTAM 3.38 G: 3; .375 INJECTION, POWDER, LYOPHILIZED, FOR SOLUTION INTRAVENOUS at 12:09

## 2020-02-12 RX ADMIN — PIPERACILLIN AND TAZOBACTAM 3.38 G: 3; .375 INJECTION, POWDER, LYOPHILIZED, FOR SOLUTION INTRAVENOUS at 20:09

## 2020-02-12 RX ADMIN — SODIUM CHLORIDE: 9 INJECTION, SOLUTION INTRAVENOUS at 17:08

## 2020-02-12 RX ADMIN — METOPROLOL SUCCINATE 50 MG: 50 TABLET, EXTENDED RELEASE ORAL at 17:08

## 2020-02-12 RX ADMIN — CEFEPIME HYDROCHLORIDE 1 G: 1 INJECTION, POWDER, FOR SOLUTION INTRAMUSCULAR; INTRAVENOUS at 04:30

## 2020-02-12 ASSESSMENT — PAIN SCALES - GENERAL
PAINLEVEL_OUTOF10: 0

## 2020-02-12 NOTE — CONSULTS
Consult received  Full note to follow    Xavier Theodore  2/12/2020    [facility] [address]  Office : [phone]  Fax :[phone]

## 2020-02-12 NOTE — CONSULTS
including shoulder     Trigeminal neuralgia        Past Surgical History:   Procedure Laterality Date    ABDOMINAL AORTIC ANEURYSM REPAIR, ENDOVASCULAR  4/27/16    BLADDER TUMOR EXCISION      CATARACT REMOVAL WITH IMPLANT Bilateral     HERNIA REPAIR      NEPHROSTOMY      SKIN CANCER EXCISION  10/15/2015    squamous    TOTAL NEPHRECTOMY Left 1994       History reviewed. No pertinent family history. reports that he quit smoking about 3 years ago. He has a 17.50 pack-year smoking history. He has quit using smokeless tobacco. He reports that he does not drink alcohol or use drugs. Medications: Allergies:  Patient has no known allergies. Scheduled Meds:   piperacillin-tazobactam  3.375 g Intravenous Q8H    pravastatin  40 mg Oral Nightly    aspirin  81 mg Oral Daily    diltiazem  90 mg Oral BID    metoprolol  5 mg Intravenous Q6H    sodium chloride flush  10 mL Intravenous 2 times per day    sodium chloride flush  10 mL Intravenous 2 times per day     Continuous Infusions:   sodium chloride 100 mL/hr at 02/11/20 1901       Labs:  CBC:   Recent Labs     02/11/20  0321 02/11/20  0500 02/12/20  0457   WBC 2.9* 1.8* 19.2*   HGB 14.4 14.2 12.9*   * 99* 62*     Ca/Mg/Phos:   Recent Labs     02/11/20  0321 02/12/20  0457   CALCIUM 9.0 8.6   MG  --  1.70*   PHOS  --  4.8     UA:No results for input(s): Edil Kevin, GLUCOSEU, BILIRUBINUR, KETUA, SPECGRAV, BLOODU, PHUR, PROTEINU, UROBILINOGEN, NITRU, LEUKOCYTESUR, Adelene Burows in the last 72 hours. Urine Microscopic: No results for input(s): LABCAST, BACTERIA, COMU, HYALCAST, WBCUA, RBCUA, EPIU in the last 72 hours. Urine Chemistry: No results for input(s): Jayleen Vance, PROTEINUR, NAUR in the last 72 hours.     Objective:   Vitals: /69   Pulse 94   Temp 98.2 °F (36.8 °C) (Axillary)   Resp 17   Ht 5' 8\" (1.727 m)   Wt 179 lb 7.3 oz (81.4 kg)   SpO2 98%   BMI 27.29 kg/m²    Wt Readings from Last 3 Encounters:   02/11/20 179 lb 7.3 oz (81.4 kg)   01/18/18 201 lb (91.2 kg)   10/26/17 205 lb (93 kg)      24HR INTAKE/OUTPUT:      Intake/Output Summary (Last 24 hours) at 2/12/2020 1132  Last data filed at 2/12/2020 9233  Gross per 24 hour   Intake 2485 ml   Output 600 ml   Net 1885 ml     Constitutional:  awake, mild distress  HEENT:  MMM, No icterus  Neck: no bruits, No JVD  Cardiovascular:  S1, S2 reg  Respiratory: harsh ves breathing  Abdomen:  +BS, soft, NT, ND  Ext: trace lower extremity edema  Psychiatric: mood and affect appropriate  Skin: dry/intact  CNS: alert, no agitation    IMAGING:  CT CHEST WO CONTRAST   Final Result   Trace bilateral pleural effusions. Patchy ground-glass opacity bilaterally   can be on the basis of atelectasis, mild edema, or pneumonitis. Sequela of granulomatous disease. Several noncalcified pulmonary nodules are   also seen which are technically indeterminate, the largest within the right   perihilar region measuring approximately 1.5 cm. Enlarged AP window lymph   node is also seen. Continued follow-up after treatment and resolution of any   acute symptoms is recommended. XR CHEST PORTABLE   Final Result   Cardiomegaly and mild pulmonary vascular congestion. Assessment :     1. LEE on CKD  -Non-Oliguric  -Baseline creat: 1.2-1.3 Now 1.4->2.3  -single kidney. Recent contrast, NSAID use, hypotension  -Volume: euvol to mild hypervol  -Electrolytes/Acid-Base: no dyskalemia. HypoMg, AGMA    Recent Labs     02/11/20  0321 02/12/20  0457   BUN 19 38*   CREATININE 1.4* 2.3*     Recent Labs     02/11/20  0321 02/12/20  0457    140   K 4.2 4.3   CO2 17* 20*   MG  --  1.70*       2. HTN  -blood pressure at goal    Weight: 179 lb 7.3 oz (81.4 kg), BP: 114/69    3. CAD  -status post cardiac cath (2/10) on medical management  -TTE (feb 2020): mild LVH, LVEF 50-55%    4.  COPD    Plan:       -No NSAID  -Hold ACEI/ARB for now  -May dc IVF  -supplement Mg  -Hemodynamic support to maintain MAP>65  -UA, Ur creat, Ur urea    -Monitor I/O, UOP  -Avoid nephrotoxin, if able. -Dose meds to current eGFR    Thank you for allowing us to participate in care of Rafia Hunter . We will continue to follow. Feel free to contact me with any questions.       Nevada Challenger  2/12/2020    [facility] [address] [place] [address] [address]  Office : [phone]  Fax :[phone]

## 2020-02-12 NOTE — PROGRESS NOTES
Patient sat up in the chair most of the shift, he is continent of urine and stool. He has has several soft small stools this shift. All urine labs have been sent. He is eating well. Fluids continue to infuse ordered by Dr Ginny Arias. The patient became more confused as evening approached. Vitals stable. Call light in reach. Bed alarm set.

## 2020-02-12 NOTE — PLAN OF CARE
Problem: Risk for Impaired Skin Integrity  Goal: Tissue integrity - skin and mucous membranes  Description  Structural intactness and normal physiological function of skin and  mucous membranes.   Outcome: Met This Shift     Problem: Falls - Risk of:  Goal: Will remain free from falls  Description  Will remain free from falls  2/12/2020 1503 by Alexia Wynne RN  Outcome: Met This Shift  2/12/2020 1151 by Armando Romero  Outcome: Ongoing  Goal: Absence of physical injury  Description  Absence of physical injury  Outcome: Met This Shift

## 2020-02-12 NOTE — PROGRESS NOTES
Hospitalist Progress Note      PCP: Letitia Pruitt, APRN - CNP    Date of Admission: 2/10/2020    Subjective: more awake today, fever improved    Medications:  Reviewed    Infusion Medications   Scheduled Medications    piperacillin-tazobactam  3.375 g Intravenous Q8H    magnesium oxide  400 mg Oral BID    pravastatin  40 mg Oral Nightly    aspirin  81 mg Oral Daily    diltiazem  90 mg Oral BID    metoprolol  5 mg Intravenous Q6H    sodium chloride flush  10 mL Intravenous 2 times per day    sodium chloride flush  10 mL Intravenous 2 times per day     PRN Meds: labetalol, sodium chloride flush, sodium chloride flush, acetaminophen, sodium chloride, magnesium hydroxide, ondansetron      Intake/Output Summary (Last 24 hours) at 2/12/2020 1451  Last data filed at 2/12/2020 1227  Gross per 24 hour   Intake 2725 ml   Output 600 ml   Net 2125 ml       Physical Exam Performed:    /69   Pulse 102   Temp 98.2 °F (36.8 °C) (Axillary)   Resp (!) 34   Ht 5' 8\" (1.727 m)   Wt 179 lb 7.3 oz (81.4 kg)   SpO2 98%   BMI 27.29 kg/m²     General appearance: awake in recliner  HEENT: Normal cephalic, atraumatic without obvious deformity. Pupils equal, round, and reactive to light. Extra ocular muscles intact. Conjunctivae/corneas clear. Neck: Supple, with full range of motion. No jugular venous distention. Trachea midline. Respiratory:  Diminished b/l  Cardiovascular: Regular rate and rhythm with normal S1/S2   Abdomen: Soft, non-tender, non-distended with normal bowel sounds. Musculoskeletal: No clubbing, cyanosis or edema bilaterally. Skin: Skin color, texture, turgor normal.  No rashes or lesions.   Neurologic:  awake, alert, grossly non focal  Psychiatric: alert, oriented to self/place  Capillary Refill: Brisk,< 3 seconds   Peripheral Pulses: +2 palpable, equal bilaterally        Labs:   Recent Labs     02/11/20  0321 02/11/20  0500 02/12/20  0457   WBC 2.9* 1.8* 19.2*   HGB 14.4 14.2 12.9*   HCT 43.6

## 2020-02-12 NOTE — PROGRESS NOTES
Patient alert to self only. Patient confused. Bed alarm on.  Electronically signed by Екатерина Mayo RN on 2/12/2020 at 5:52 AM

## 2020-02-12 NOTE — PROGRESS NOTES
[facility]  Inpatient Progress Note    CC: Syncope/abnormal EKG                Shi Pizano, APRN - CNP:            HPI:   This is a 80 y.o. male with history of abdominal aortic aneurysm repair, seizure disorder, diabetes, nephrectomy bladder cancer who came to [facility] with a questionable syncopal episode. The patient denies it he says he deftly did not have a seizure. His initial EKG shows old inferior infarct with Q waves in the inferior leads with some slight ST elevations. There were also nonspecific ST depressions throughout. Troponins are just borderline elevated 0.11. He never had chest pain and continues to deny it.     Looks very old but states that he is mobile and can walk a mile. Interval history  Apparently the patient became tachycardic, febrile last night. Temperature was 103. He is now placed on antibiotics also blood pressure was very high and was given large amounts of antihypertensive. These include labetalol hydralazine and Zofran  .   Later became hypotensive   Patient is quite somnolent and difficult to awake  Blood pressure in the upper 90s     Review of Systems -   Constitutional: Negative for weight gain/loss; malaise, fever  Respiratory: Negative for Asthma;  cough and hemoptysis  Cardiovascular: Negative for palpitations,dizziness   Gastrointestinal: Negative for abd.pain; constipation/diarrhea;    Genitourinary: Negative for stones; hematuria; frequency hesitancy  Integumentt: Negative for rash or pruritis  Hematologic/lymphatic: Negative for blood dyscrasia; leukemia/lymphoma  Musculoskeletal: Negative for Connective tissue disease  Neurological:  Negative for Seizure   Behavioral/Psych:Negative for Bipolar disorder, Schizophrenia; Dementia  Endocrine: negative for thyroid, parathyroid disease      Intake/Output Summary (Last 24 hours) at 2/12/2020 1620  Last data filed at 2/12/2020 1227  Gross per 24 hour   Intake 2725 ml   Output 600 ml   Net 2125 ml         Physical Examination:    /69   Pulse 96   Temp 98.2 °F (36.8 °C) (Axillary)   Resp 22   Ht 5' 8\" (1.727 m)   Wt 179 lb 7.3 oz (81.4 kg)   SpO2 98%   BMI 27.29 kg/m²   HEENT:  Face: Atraumatic, Conjunctiva: Pink; non icteric,  Mucous Memb:  Moist, No thyromegaly or Lymphadenopathy  Respiratory:  Resp Assessment: normal, Resp Auscultation: clear   Cardiovascular: Auscultation: nl S1 & S2, Palpation:  Nl PMI;  No heaves or thrills, JVP:  normal  Abdomen: Soft, non-tender, Normal bowel sounds,  No organomegaly  Extremities: No Cyanosis or Clubbing; Edema none  Neurological: Deep sleep  Psychiatric:   Skin: Warm and dry,  No rash seen    Current Facility-Administered Medications: piperacillin-tazobactam (ZOSYN) 3.375 g in dextrose 5 % 100 mL IVPB extended infusion (mini-bag), 3.375 g, Intravenous, Q8H  magnesium oxide (MAG-OX) tablet 400 mg, 400 mg, Oral, BID  metoprolol succinate (TOPROL XL) extended release tablet 50 mg, 50 mg, Oral, Daily  0.9 % sodium chloride infusion, , Intravenous, Continuous  labetalol (NORMODYNE;TRANDATE) injection 10 mg, 10 mg, Intravenous, Q6H PRN  pravastatin (PRAVACHOL) tablet 40 mg, 40 mg, Oral, Nightly  aspirin EC tablet 81 mg, 81 mg, Oral, Daily  sodium chloride flush 0.9 % injection 10 mL, 10 mL, Intravenous, 2 times per day  sodium chloride flush 0.9 % injection 10 mL, 10 mL, Intravenous, PRN  sodium chloride flush 0.9 % injection 10 mL, 10 mL, Intravenous, 2 times per day  sodium chloride flush 0.9 % injection 10 mL, 10 mL, Intravenous, PRN  acetaminophen (TYLENOL) tablet 650 mg, 650 mg, Oral, Q4H PRN  0.9 % sodium chloride bolus, 500 mL, Intravenous, PRN  magnesium hydroxide (MILK OF MAGNESIA) 400 MG/5ML suspension 30 mL, 30 mL, Oral, Daily PRN  ondansetron (ZOFRAN) injection 4 mg, 4 mg, Intravenous, Q6H PRN         Labs:   Recent Labs     02/11/20  0500 02/12/20  0457   WBC 1.8* 19.2*   HGB 14.2 12.9*   HCT 41.8 38.8*   PLT 99* 62*     Recent Labs 02/11/20  0321 02/12/20  0457    140   K 4.2 4.3   CO2 17* 20*   BUN 19 38*   CREATININE 1.4* 2.3*   LABGLOM 48* 27*     No results for input(s): BNP in the last 72 hours. No results for input(s): PROTIME, INR in the last 72 hours. Recent Labs     02/11/20  0321 02/11/20  0928 02/11/20  1548   TROPONINI 0.12* 0.38* 0.40*         Telemetry Monitor:       EKG:   Sinus rhythm with possible old inferior infarct and nonspecific ST depressions      Corornary angiogram ; 2/11/2020  Normal left-sided circulation including the LAD and a codominant circumflex  Subtotal occlusion of codominant RCA in the mid segment  Normal LV size and systolic function      ASSESSMENT AND PLAN:      80year-old patient who had an unresponsive episode without syncope at home. He was brought to [facility] and found to have a slight bump in his troponin. He was therefore transferred here for diagnostic angiography and possible PCI. He underwent angiography yesterday and found to have an occluded codominant right coronary artery in the midsegment with sluggish antegrade flow and collaterals to the right from the left. The left circulation which is codominant is free of disease. LV function appeared good    Given the patient's age, extremely sedentary lifestyle, single kidney with history of chronic kidney disease I elected not to intervene. Not sure if I was able to cross the lesion this would have required fair amount of contrast which would have jeopardize his kidneys. Not sure what happened last night. He was febrile. Sepsis suspected being treated with antibiotics    Is also been treated with IV fluids to prevent contrast nephropathy.   Creatinine is 1.4    Laying flat and sleeping may be due to use of Zofran  Will continue to observe closely          Mary Porras M.D  2/11/2020

## 2020-02-12 NOTE — PROGRESS NOTES
[facility]  Inpatient Progress Note    CC: Syncope/abnormal EKG                Chuynatividad Pikejuno, KENZIE - CNP:            HPI:   This is a 80 y.o. male with history of abdominal aortic aneurysm repair, seizure disorder, diabetes, nephrectomy bladder cancer who came to [facility] with a questionable syncopal episode. The patient denies it he says he deftly did not have a seizure. His initial EKG shows old inferior infarct with Q waves in the inferior leads with some slight ST elevations. There were also nonspecific ST depressions throughout. Troponins are just borderline elevated 0.11. He never had chest pain and continues to deny it.     Looks very old but states that he is mobile and can walk a mile.         Interval history; 2/12/2020  Patient is woken up sitting in a chair completely normal.  Denies chest pain or shortness of breath  Received antibiotics  Blood cultures positive for E. coli     Review of Systems -   Constitutional: Negative for weight gain/loss; malaise, fever  Respiratory: Negative for Asthma;  cough and hemoptysis  Cardiovascular: Negative for palpitations,dizziness   Gastrointestinal: Negative for abd.pain; constipation/diarrhea;    Genitourinary: Negative for stones; hematuria; frequency hesitancy  Integumentt: Negative for rash or pruritis  Hematologic/lymphatic: Negative for blood dyscrasia; leukemia/lymphoma  Musculoskeletal: Negative for Connective tissue disease  Neurological:  Negative for Seizure   Behavioral/Psych:Negative for Bipolar disorder, Schizophrenia; Dementia  Endocrine: negative for thyroid, parathyroid disease      Intake/Output Summary (Last 24 hours) at 2/12/2020 1626  Last data filed at 2/12/2020 1227  Gross per 24 hour   Intake 2725 ml   Output 600 ml   Net 2125 ml         Physical Examination:    /69   Pulse 96   Temp 98.2 °F (36.8 °C) (Axillary)   Resp 22   Ht 5' 8\" (1.727 m)   Wt 179 lb 7.3 oz (81.4 kg)   SpO2 98%   BMI 27.29 kg/m²   HEENT:  Face: Atraumatic, Conjunctiva: Pink; non icteric,  Mucous Memb:  Moist, No thyromegaly or Lymphadenopathy  Respiratory:  Resp Assessment: normal, Resp Auscultation: clear   Cardiovascular: Auscultation: nl S1 & S2, Palpation:  Nl PMI; No heaves or thrills, JVP:  normal  Abdomen: Soft, non-tender, Normal bowel sounds,  No organomegaly  Extremities: No Cyanosis or Clubbing; Edema none  Neurological: Deep sleep  Psychiatric:   Skin: Warm and dry,  No rash seen    Current Facility-Administered Medications: piperacillin-tazobactam (ZOSYN) 3.375 g in dextrose 5 % 100 mL IVPB extended infusion (mini-bag), 3.375 g, Intravenous, Q8H  magnesium oxide (MAG-OX) tablet 400 mg, 400 mg, Oral, BID  metoprolol succinate (TOPROL XL) extended release tablet 50 mg, 50 mg, Oral, Daily  0.9 % sodium chloride infusion, , Intravenous, Continuous  labetalol (NORMODYNE;TRANDATE) injection 10 mg, 10 mg, Intravenous, Q6H PRN  pravastatin (PRAVACHOL) tablet 40 mg, 40 mg, Oral, Nightly  aspirin EC tablet 81 mg, 81 mg, Oral, Daily  sodium chloride flush 0.9 % injection 10 mL, 10 mL, Intravenous, 2 times per day  sodium chloride flush 0.9 % injection 10 mL, 10 mL, Intravenous, PRN  sodium chloride flush 0.9 % injection 10 mL, 10 mL, Intravenous, 2 times per day  sodium chloride flush 0.9 % injection 10 mL, 10 mL, Intravenous, PRN  acetaminophen (TYLENOL) tablet 650 mg, 650 mg, Oral, Q4H PRN  0.9 % sodium chloride bolus, 500 mL, Intravenous, PRN  magnesium hydroxide (MILK OF MAGNESIA) 400 MG/5ML suspension 30 mL, 30 mL, Oral, Daily PRN  ondansetron (ZOFRAN) injection 4 mg, 4 mg, Intravenous, Q6H PRN         Labs:   Recent Labs     02/11/20  0500 02/12/20  0457   WBC 1.8* 19.2*   HGB 14.2 12.9*   HCT 41.8 38.8*   PLT 99* 62*     Recent Labs     02/11/20  0321 02/12/20  0457    140   K 4.2 4.3   CO2 17* 20*   BUN 19 38*   CREATININE 1.4* 2.3*   LABGLOM 48* 27*     No results for input(s): BNP in the last 72 hours.   No results

## 2020-02-12 NOTE — PROGRESS NOTES
Noted change in patient condition from earlier notations. Pt was confused (disoriented to place, time, and situation. RR 32 on 3l per NC. POC Glucose 236. Informed primary nurse Jayson of observed changes.

## 2020-02-13 ENCOUNTER — APPOINTMENT (OUTPATIENT)
Dept: CT IMAGING | Age: 85
DRG: 280 | End: 2020-02-13
Attending: INTERNAL MEDICINE
Payer: MEDICARE

## 2020-02-13 LAB
ADENOVIRUS, DFA: NEGATIVE
ANION GAP SERPL CALCULATED.3IONS-SCNC: 13 MMOL/L (ref 3–16)
ANISOCYTOSIS: ABNORMAL
BASOPHILS ABSOLUTE: 0.1 K/UL (ref 0–0.2)
BASOPHILS RELATIVE PERCENT: 1 %
BLOOD CULTURE, ROUTINE: ABNORMAL
BLOOD CULTURE, ROUTINE: ABNORMAL
BUN BLDV-MCNC: 45 MG/DL (ref 7–20)
CALCIUM SERPL-MCNC: 8.6 MG/DL (ref 8.3–10.6)
CHLORIDE BLD-SCNC: 106 MMOL/L (ref 99–110)
CO2: 20 MMOL/L (ref 21–32)
CREAT SERPL-MCNC: 2.1 MG/DL (ref 0.8–1.3)
EOSINOPHILS ABSOLUTE: 0.1 K/UL (ref 0–0.6)
EOSINOPHILS RELATIVE PERCENT: 1 %
GFR AFRICAN AMERICAN: 36
GFR NON-AFRICAN AMERICAN: 30
GLUCOSE BLD-MCNC: 76 MG/DL (ref 70–99)
HCT VFR BLD CALC: 33.4 % (ref 40.5–52.5)
HEMOGLOBIN: 11.3 G/DL (ref 13.5–17.5)
INFLUENZA A,DFA: NEGATIVE
INFLUENZA B,DFA: NEGATIVE
LACTIC ACID: 1.2 MMOL/L (ref 0.4–2)
LYMPHOCYTES ABSOLUTE: 0.1 K/UL (ref 1–5.1)
LYMPHOCYTES RELATIVE PERCENT: 1 %
MAGNESIUM: 1.8 MG/DL (ref 1.8–2.4)
MCH RBC QN AUTO: 33 PG (ref 26–34)
MCHC RBC AUTO-ENTMCNC: 33.7 G/DL (ref 31–36)
MCV RBC AUTO: 97.9 FL (ref 80–100)
METAPNEUMOVIRUS, DFA: NEGATIVE
MONOCYTES ABSOLUTE: 1.5 K/UL (ref 0–1.3)
MONOCYTES RELATIVE PERCENT: 11 %
NEUTROPHILS ABSOLUTE: 11.4 K/UL (ref 1.7–7.7)
NEUTROPHILS RELATIVE PERCENT: 86 %
ORGANISM: ABNORMAL
ORGANISM: ABNORMAL
PARAINFLUENZA 1 DFA STAIN: NEGATIVE
PARAINFLUENZA 2 DFA STAIN: NEGATIVE
PARAINFLUENZA 3: NEGATIVE
PDW BLD-RTO: 15.1 % (ref 12.4–15.4)
PHOSPHORUS: 2.9 MG/DL (ref 2.5–4.9)
PLATELET # BLD: 51 K/UL (ref 135–450)
PLATELET SLIDE REVIEW: ABNORMAL
PMV BLD AUTO: 9.2 FL (ref 5–10.5)
POTASSIUM SERPL-SCNC: 3.5 MMOL/L (ref 3.5–5.1)
PROCALCITONIN: 11.16 NG/ML (ref 0–0.15)
RBC # BLD: 3.41 M/UL (ref 4.2–5.9)
RESPIRATORY SYNCYTIAL VIRUS  (RSV) DFA: NEGATIVE
RSPFA SOURCE: NORMAL
SLIDE REVIEW: ABNORMAL
SODIUM BLD-SCNC: 139 MMOL/L (ref 136–145)
URINE CULTURE, ROUTINE: NORMAL
VACUOLATED NEUTROPHILS: PRESENT
WBC # BLD: 13.3 K/UL (ref 4–11)

## 2020-02-13 PROCEDURE — 74176 CT ABD & PELVIS W/O CONTRAST: CPT

## 2020-02-13 PROCEDURE — 80048 BASIC METABOLIC PNL TOTAL CA: CPT

## 2020-02-13 PROCEDURE — 97166 OT EVAL MOD COMPLEX 45 MIN: CPT

## 2020-02-13 PROCEDURE — 84100 ASSAY OF PHOSPHORUS: CPT

## 2020-02-13 PROCEDURE — 97162 PT EVAL MOD COMPLEX 30 MIN: CPT

## 2020-02-13 PROCEDURE — 2580000003 HC RX 258: Performed by: INTERNAL MEDICINE

## 2020-02-13 PROCEDURE — 99233 SBSQ HOSP IP/OBS HIGH 50: CPT | Performed by: INTERNAL MEDICINE

## 2020-02-13 PROCEDURE — 97530 THERAPEUTIC ACTIVITIES: CPT

## 2020-02-13 PROCEDURE — 85025 COMPLETE CBC W/AUTO DIFF WBC: CPT

## 2020-02-13 PROCEDURE — 1200000000 HC SEMI PRIVATE

## 2020-02-13 PROCEDURE — 83605 ASSAY OF LACTIC ACID: CPT

## 2020-02-13 PROCEDURE — 94760 N-INVAS EAR/PLS OXIMETRY 1: CPT

## 2020-02-13 PROCEDURE — 6370000000 HC RX 637 (ALT 250 FOR IP): Performed by: INTERNAL MEDICINE

## 2020-02-13 PROCEDURE — 6360000002 HC RX W HCPCS: Performed by: INTERNAL MEDICINE

## 2020-02-13 PROCEDURE — 97535 SELF CARE MNGMENT TRAINING: CPT

## 2020-02-13 PROCEDURE — 36415 COLL VENOUS BLD VENIPUNCTURE: CPT

## 2020-02-13 PROCEDURE — 83735 ASSAY OF MAGNESIUM: CPT

## 2020-02-13 PROCEDURE — 84145 PROCALCITONIN (PCT): CPT

## 2020-02-13 PROCEDURE — 6370000000 HC RX 637 (ALT 250 FOR IP): Performed by: HOSPITALIST

## 2020-02-13 RX ADMIN — ASPIRIN 81 MG: 81 TABLET, COATED ORAL at 10:26

## 2020-02-13 RX ADMIN — PIPERACILLIN AND TAZOBACTAM 3.38 G: 3; .375 INJECTION, POWDER, LYOPHILIZED, FOR SOLUTION INTRAVENOUS at 11:57

## 2020-02-13 RX ADMIN — PRAVASTATIN SODIUM 40 MG: 40 TABLET ORAL at 21:13

## 2020-02-13 RX ADMIN — PIPERACILLIN AND TAZOBACTAM 3.38 G: 3; .375 INJECTION, POWDER, LYOPHILIZED, FOR SOLUTION INTRAVENOUS at 04:12

## 2020-02-13 RX ADMIN — METOPROLOL SUCCINATE 50 MG: 50 TABLET, EXTENDED RELEASE ORAL at 10:26

## 2020-02-13 RX ADMIN — SODIUM CHLORIDE, PRESERVATIVE FREE 10 ML: 5 INJECTION INTRAVENOUS at 21:10

## 2020-02-13 RX ADMIN — PIPERACILLIN AND TAZOBACTAM 3.38 G: 3; .375 INJECTION, POWDER, LYOPHILIZED, FOR SOLUTION INTRAVENOUS at 21:10

## 2020-02-13 RX ADMIN — Medication 400 MG: at 10:26

## 2020-02-13 ASSESSMENT — PAIN SCALES - GENERAL
PAINLEVEL_OUTOF10: 0
PAINLEVEL_OUTOF10: 0

## 2020-02-13 NOTE — CARE COORDINATION
Sw spoke with patient regarding therapy recommendation for snf placement. Sw provided patient with snf list. He gave Sw permission to speak with his spouse/family regarding snf. Sw spoke with patient's spouse Luis Albarran [phone]) regarding snf placement. Sw explained snf list lef tin patient room and reviewed snfs in her area with her. She requested Sw call her son, Lewis Rios ([phone]) regarding snfs. Aide spoke with Javier Simons and explained therpasergio recommendation for short snf stay. Sw reviewed snf list with Javier Simons. He reported that he does NOT want referrals to [facility] or [place] (patient's brother  at [facility]). He is agreeable to referrals to [address] [place] [address]. Sw made referrals to preferred facilities.      Electronically signed by Ples Twiggs on 2020 at 4:58 PM

## 2020-02-13 NOTE — PROGRESS NOTES
Physical Therapy    Facility/Department: [facility] 3W ORTHOPEDICS  Initial Assessment    NAME: Tiff Womack  : 1934  MRN: [id number]    Date of Service: 2020    Discharge Recommendations:  3-5 sessions per week   PT Equipment Recommendations  Equipment Needed: No    Assessment   Body structures, Functions, Activity limitations: Decreased functional mobility ; Decreased balance;Decreased strength  Assessment: Pt is an 79 y/o male who presents for possible syncopal episode and confusion. Pt currently requires min/mod A for transfers and was unable to ambulate this date. Per pt report, he was independent at baseline with intermittent use of 4WW. Pt would benefit from continued skilled PT to address these limitations. Pt will require continued therapy 3-5x/wk upon discharge. Treatment Diagnosis: impaired transfers  Prognosis: Fair  Decision Making: Medium Complexity  PT Education: Goals;PT Role;General Safety;Plan of Care;Orientation  Patient Education: pt verbalized understanding but would benefit from reinforcement  REQUIRES PT FOLLOW UP: Yes  Activity Tolerance  Activity Tolerance: Patient limited by fatigue;Patient limited by endurance       Patient Diagnosis(es): There were no encounter diagnoses. 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 has a past surgical history that includes bladder tumor excision; Nephrostomy; hernia repair; total nephrectomy (Left, ); Skin cancer excision (10/15/2015);  Cataract removal with Time Out 0855         Minutes 40         Timed Code Treatment Minutes: [address] [facility], [address] [facility] [id number]

## 2020-02-13 NOTE — PROGRESS NOTES
Office: [phone]       Fax: [phone]      Nephrology Progress Note        Patient's Name: Tia Meneses Date: 2/10/2020  Date of Visit: 2/13/2020    Reason for Consult:  LEE      Subjective:      Luis Taylor is a 80 y.o. male with PMHx of hypertension, AAA status post repair 2016, DM, seizure, COPD, CVA, bladder cancer status post nephrectomy (details not known), CKD  who was hospitalized on 2/10/2020 after transfer from [place] [address] where he presented with syncope and elevated trop. Initial creat at [facility] was 1. 3. he underwent cardiac cath (70 ml iv contrast) on 2/10 that showed \"Single-vessel coronary artery disease with subtotal occlusion of mid right coronary artery\". Patient is on medical management. Creat noted to trend to 1.4 then 2.3. IV Vanco given 2/11, Zosyn on 2.12. he also received Ibuprofen 600 mg on 2/11. Has fair UO (not quantified). Reported history of renal stone in past. No current hematuria. Denies previous NSAID use. Patient is not good historian and tends to forget. Interval History  Feels better  Fair UO  Creat improving     Medications: Allergies:  Patient has no known allergies.     Scheduled Meds:   piperacillin-tazobactam  3.375 g Intravenous Q8H    magnesium oxide  400 mg Oral BID    metoprolol succinate  50 mg Oral Daily    pravastatin  40 mg Oral Nightly    aspirin  81 mg Oral Daily    sodium chloride flush  10 mL Intravenous 2 times per day    sodium chloride flush  10 mL Intravenous 2 times per day     Continuous Infusions:   sodium chloride 100 mL/hr at 02/13/20 0605       Labs:  CBC:   Recent Labs     02/11/20  0500 02/12/20  0457 02/13/20  0447   WBC 1.8* 19.2* 13.3*   HGB 14.2 12.9* 11.3*   PLT 99* 62* 51*     Ca/Mg/Phos:   Recent Labs     02/11/20  0321 02/12/20  0457 02/13/20  0447   CALCIUM 9.0 8.6 8.6   MG  --  1.70* 1.80   PHOS  --  4.8 2.9     UA:  Recent Labs     02/12/20  1635   COLORU DK YELLOW   CLARITYU CLOUDY*   GLUCOSEU Negative   BILIRUBINUR Negative   KETUA TRACE*   SPECGRAV 1.029   BLOODU MODERATE*   PHUR 5.0   PROTEINU 100*   UROBILINOGEN 0.2   NITRU Negative   LEUKOCYTESUR Negative   LABMICR YES   URINETYPE NotGiven      Urine Microscopic:   Recent Labs     02/12/20  1635   BACTERIA RARE*   HYALCAST 10*   WBCUA 8*   RBCUA 2   EPIU 2     Urine Chemistry:   Recent Labs     02/12/20  1635   LABCREA 188.1       Objective:   Vitals: BP (!) 154/77   Pulse 84   Temp 98.2 °F (36.8 °C) (Oral)   Resp 17   Ht 5' 8\" (1.727 m)   Wt 181 lb 7 oz (82.3 kg)   SpO2 97%   BMI 27.59 kg/m²    Wt Readings from Last 3 Encounters:   02/13/20 181 lb 7 oz (82.3 kg)   01/18/18 201 lb (91.2 kg)   10/26/17 205 lb (93 kg)      24HR INTAKE/OUTPUT:      Intake/Output Summary (Last 24 hours) at 2/13/2020 5903  Last data filed at 2/13/2020 4123  Gross per 24 hour   Intake 2335 ml   Output 625 ml   Net 1710 ml     Constitutional:  awake, mild distress  HEENT:  MMM, No icterus  Neck: no bruits, No JVD  Cardiovascular:  S1, S2 reg  Respiratory: harsh ves breathing  Abdomen:  +BS, soft, NT, ND  Ext: trace lower extremity edema  Psychiatric: mood and affect appropriate  Skin: dry/intact  CNS: alert, no agitation    IMAGING:  CT CHEST WO CONTRAST   Final Result   Trace bilateral pleural effusions. Patchy ground-glass opacity bilaterally   can be on the basis of atelectasis, mild edema, or pneumonitis. Sequela of granulomatous disease. Several noncalcified pulmonary nodules are   also seen which are technically indeterminate, the largest within the right   perihilar region measuring approximately 1.5 cm. Enlarged AP window lymph   node is also seen. Continued follow-up after treatment and resolution of any   acute symptoms is recommended.          XR CHEST PORTABLE   Final Result   Cardiomegaly and mild pulmonary vascular congestion. CT ABDOMEN PELVIS WO CONTRAST Additional Contrast? None    (Results Pending)       Assessment :     1. LEE on CKD  -Non-Oliguric  -Baseline creat: 1.2-1.3 Now 1.4->2.3->2.1  -FeUrea 19.6%  -single kidney. Recent contrast, NSAID use, hypotension  -Volume: euvol to mild hypervol  -Electrolytes/Acid-Base: no dyskalemia. HypoMg, AGMA    Recent Labs     02/11/20  0321 02/12/20  0457 02/13/20  0447   BUN 19 38* 45*   CREATININE 1.4* 2.3* 2.1*     Recent Labs     02/11/20  0321 02/12/20  0457 02/13/20  0447    140 139   K 4.2 4.3 3.5   CO2 17* 20* 20*   MG  --  1.70* 1.80       2. HTN  -blood pressure high    Weight: 181 lb 7 oz (82.3 kg), BP: (!) 154/77    3. CAD  -status post cardiac cath (2/10) on medical management  -TTE (feb 2020): mild LVH, LVEF 50-55%    4. COPD    Plan:     -may dc IVF  -No NSAID  -Hold ACEI/ARB for now  -supplement Mg as needed  -Hemodynamic support to maintain MAP>65      -Monitor I/O, UOP  -Avoid nephrotoxin, if able. -Dose meds to current eGFR    Thank you for allowing us to participate in care of Damaso Rodriguez . We will continue to follow. Feel free to contact me with any questions.       Abraham Browning  2/13/2020    [facility] [address]  Office : [phone]  Fax :[phone]

## 2020-02-13 NOTE — PLAN OF CARE
Problem: Risk for Impaired Skin Integrity  Goal: Tissue integrity - skin and mucous membranes  Description  Structural intactness and normal physiological function of skin and  mucous membranes. Outcome: Ongoing  Note:   Will continue to monitor for signs of skin breakdown. Problem: Falls - Risk of:  Goal: Will remain free from falls  Description  Will remain free from falls  Outcome: Ongoing  Note:   Fall precautions in place. Call light within reach. Pt educated on calling for assistance before getting up. Walkway free of clutter. Will continue to monitor. Goal: Absence of physical injury  Description  Absence of physical injury  Outcome: Ongoing  Note:   Pt is free of injury. No injury noted. Fall precautions in place. Call light within reach. Will monitor.

## 2020-02-13 NOTE — PROGRESS NOTES
Patient is in bed, family visiting. Patient is alert, slightly confused, but able to follow commands well. Patient is tolerating fluids well, and is up to the toilet X1 with a stedy. IV remains infusing. No complaints at this time. Fall precautions are in place. Call light is in reach. Will continue to monitor.     Electronically signed by Dawn Mcnally RN on 2/13/2020 at 12:13 AM

## 2020-02-13 NOTE — PROGRESS NOTES
Pt is alert to self. Pt is currently in bedside chair with bed alarm on. No needs at this time. Will continue to monitor.  Electronically signed by David Tran on 2/13/2020 at 2:11 PM

## 2020-02-13 NOTE — PROGRESS NOTES
Assessment/Plan:    Active Hospital Problems    Diagnosis    Mental status change resolved [Z86.59]         NSTEMI - cardio following, s/p cath with occlusion but no PCI performed, medical mgmt    Presented with syncope - suspect related to NSTEMI     Sepsis/Fever - suspect 2/2 bacteremia, reviewed CT chest/UA, started on cefepime/vanco-->zosyn, blood cx with e coli, CT abd today    E coli bacteremia - cont IV abx, recheck blood cx neg to date     ARF on CKD II - baseline creat 1.2-1.3, creat improved from 2.3-->2.1 today, suspect pre-renal vs ATN from hypotension, also had contrast with cath. Monitor creat, nephrology consulted.  Pt has h/o nephrectomy - has a single kidney     Lactic acidosis - suspect 2/2 sepsis, on IVF, resolved     HTN - controlled      Suspected early dementia/acute metabolic encephalopathy - suspect 2/2 above, improving        DVT Prophylaxis: SCD  Diet: DIET CARDIAC;  Code Status: Full Code    PT/OT Eval Status: ordered    Trenton Haque MD

## 2020-02-13 NOTE — PROGRESS NOTES
Occupational Therapy   Occupational Therapy Initial Assessment  Should patient be discharged prior to another treatment session, this note shall serve as the discharge summary. Date: 2020   Patient Name: Luis Taylor  MRN: [id number]     : 1934    Date of Service: 2020    Discharge Recommendations:  3-5 sessions per week, Patient would benefit from continued therapy after discharge       Assessment   Performance deficits / Impairments: Decreased functional mobility ; Decreased cognition;Decreased high-level IADLs;Decreased ADL status; Decreased balance;Decreased safe awareness  Assessment: Pt presents after syncopal episode and MS change. Pt requiring min A for bed mobilty and pt requiring reorientation and cues for safety. Patient would benefit from cont. OT intervention to increase independence and safety at home. Prognosis: Good  Decision Making: Medium Complexity  History: see above  Exam: functional transfers; ADL  Assistance / Modification: assist of 1, constant cues  OT Education: OT Role;Plan of Care;ADL Adaptive Strategies  Barriers to Learning: decreased cognition  REQUIRES OT FOLLOW UP: Yes  Activity Tolerance  Activity Tolerance: Treatment limited secondary to decreased cognition  Safety Devices  Safety Devices in place: Yes  Type of devices: Call light within reach; Chair alarm in place;Nurse notified; Left in chair           Patient Diagnosis(es): There were no encounter diagnoses.      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 has a past surgical history that includes bladder tumor excision; Nephrostomy; hernia repair; total nephrectomy (Left, 1994); Skin cancer excision (10/15/2015); Cataract removal with implant (Bilateral); and AAA repair, endovascular (4/27/16). Restrictions  Restrictions/Precautions  Restrictions/Precautions: Fall Risk  Position Activity Restriction  Other position/activity restrictions: 3L O2    Subjective   General  Chart Reviewed: Yes, Progress Notes, History and Physical, Orders  Patient assessed for rehabilitation services?: Yes  Additional Pertinent Hx: Dr. Dorinda Kaur, \"This is a 80 y.o. male with history of abdominal aortic aneurysm repair, seizure disorder, diabetes, nephrectomy bladder cancer who came to [facility] with a questionable syncopal episode. The patient denies it he says he deftly did not have a seizure. His initial EKG shows old inferior infarct with Q waves in the inferior leads with some slight ST elevations. There were also nonspecific ST depressions throughout. Troponins are just borderline elevated 0.11. He never had chest pain and continues to deny it. \"  Family / Caregiver Present: No  Referring Practitioner: Dr. Shubham Hughes  Diagnosis: NSTEMI, Met. encephalopathy  Subjective  Subjective: Agreed to OT but demonstrating confusion though statements, unsure why he is here.   No complaints of pain    Social/Functional History  Social/Functional History  Type of Home: House  Home Layout: One level  Home Access: Stairs to enter with rails  Entrance Stairs - Number of Steps: 3 CUCA  Entrance Stairs - Rails: Right  Bathroom Shower/Tub: Tub/Shower unit, Shower chair with back  Bathroom Toilet: Standard  Bathroom Equipment: Grab bars in shower, Grab bars around toilet  Home Equipment: 4 wheeled walker, Cane, Lift chair, Oxygen  ADL Assistance: Independent  Homemaking Responsibilities: No(wife completes)  Ambulation Assistance: Independent(reports using

## 2020-02-13 NOTE — PROGRESS NOTES
50 mg, 50 mg, Oral, Daily  0.9 % sodium chloride infusion, , Intravenous, Continuous  labetalol (NORMODYNE;TRANDATE) injection 10 mg, 10 mg, Intravenous, Q6H PRN  pravastatin (PRAVACHOL) tablet 40 mg, 40 mg, Oral, Nightly  aspirin EC tablet 81 mg, 81 mg, Oral, Daily  sodium chloride flush 0.9 % injection 10 mL, 10 mL, Intravenous, 2 times per day  sodium chloride flush 0.9 % injection 10 mL, 10 mL, Intravenous, PRN  sodium chloride flush 0.9 % injection 10 mL, 10 mL, Intravenous, 2 times per day  sodium chloride flush 0.9 % injection 10 mL, 10 mL, Intravenous, PRN  acetaminophen (TYLENOL) tablet 650 mg, 650 mg, Oral, Q4H PRN  0.9 % sodium chloride bolus, 500 mL, Intravenous, PRN  magnesium hydroxide (MILK OF MAGNESIA) 400 MG/5ML suspension 30 mL, 30 mL, Oral, Daily PRN  ondansetron (ZOFRAN) injection 4 mg, 4 mg, Intravenous, Q6H PRN         Labs:   Recent Labs     02/12/20 0457 02/13/20  0447   WBC 19.2* 13.3*   HGB 12.9* 11.3*   HCT 38.8* 33.4*   PLT 62* 51*     Recent Labs     02/12/20  0457 02/13/20  0447    139   K 4.3 3.5   CO2 20* 20*   BUN 38* 45*   CREATININE 2.3* 2.1*   LABGLOM 27* 30*     No results for input(s): BNP in the last 72 hours. No results for input(s): PROTIME, INR in the last 72 hours. Recent Labs     02/11/20  0321 02/11/20  0928 02/11/20  1548   TROPONINI 0.12* 0.38* 0.40*         Telemetry Monitor:       EKG:   Sinus rhythm with possible old inferior infarct and nonspecific ST depressions      Corornary angiogram ; 2/11/2020  Normal left-sided circulation including the LAD and a codominant circumflex  Subtotal occlusion of codominant RCA in the mid segment  Normal LV size and systolic function      ASSESSMENT AND PLAN:      80year-old patient who had an unresponsive episode without syncope at home. He was brought to [facility] and found to have a slight bump in his troponin. He was therefore transferred here for diagnostic angiography and possible PCI.   He underwent angiography yesterday and found to have an occluded codominant right coronary artery in the midsegment with sluggish antegrade flow and collaterals to the right from the left. The left circulation which is codominant is free of disease. LV function appeared good    Given the patient's age, extremely sedentary lifestyle, single kidney with history of chronic kidney disease I elected not to intervene. Not sure if I was able to cross the lesion this would have required fair amount of contrast which would have jeopardize his kidneys. 2/13/2020  Doing well sitting up in the chair no complaints  Getting IV hydration and antibiotics  Creatinine is improved from yesterday from 2.3-2.1        DAVID Benjamin M.D  2/11/2020

## 2020-02-14 LAB
ANION GAP SERPL CALCULATED.3IONS-SCNC: 11 MMOL/L (ref 3–16)
ANISOCYTOSIS: ABNORMAL
BANDED NEUTROPHILS RELATIVE PERCENT: 2 % (ref 0–7)
BASOPHILS ABSOLUTE: 0 K/UL (ref 0–0.2)
BASOPHILS RELATIVE PERCENT: 0 %
BUN BLDV-MCNC: 42 MG/DL (ref 7–20)
CALCIUM SERPL-MCNC: 9 MG/DL (ref 8.3–10.6)
CHLORIDE BLD-SCNC: 107 MMOL/L (ref 99–110)
CO2: 21 MMOL/L (ref 21–32)
CREAT SERPL-MCNC: 1.7 MG/DL (ref 0.8–1.3)
DOHLE BODIES: PRESENT
EOSINOPHILS ABSOLUTE: 0.1 K/UL (ref 0–0.6)
EOSINOPHILS RELATIVE PERCENT: 1 %
GFR AFRICAN AMERICAN: 46
GFR NON-AFRICAN AMERICAN: 38
GLUCOSE BLD-MCNC: 75 MG/DL (ref 70–99)
HCT VFR BLD CALC: 34.3 % (ref 40.5–52.5)
HEMOGLOBIN: 11.6 G/DL (ref 13.5–17.5)
LACTIC ACID: 1 MMOL/L (ref 0.4–2)
LYMPHOCYTES ABSOLUTE: 0.4 K/UL (ref 1–5.1)
LYMPHOCYTES RELATIVE PERCENT: 3 %
MAGNESIUM: 2.1 MG/DL (ref 1.8–2.4)
MCH RBC QN AUTO: 33.3 PG (ref 26–34)
MCHC RBC AUTO-ENTMCNC: 33.9 G/DL (ref 31–36)
MCV RBC AUTO: 98.1 FL (ref 80–100)
MONOCYTES ABSOLUTE: 0.3 K/UL (ref 0–1.3)
MONOCYTES RELATIVE PERCENT: 2 %
NEUTROPHILS ABSOLUTE: 12.5 K/UL (ref 1.7–7.7)
NEUTROPHILS RELATIVE PERCENT: 92 %
PDW BLD-RTO: 15 % (ref 12.4–15.4)
PHOSPHORUS: 2.4 MG/DL (ref 2.5–4.9)
PLATELET # BLD: 68 K/UL (ref 135–450)
PLATELET SLIDE REVIEW: ABNORMAL
PMV BLD AUTO: 9.2 FL (ref 5–10.5)
POTASSIUM SERPL-SCNC: 3.7 MMOL/L (ref 3.5–5.1)
RBC # BLD: 3.5 M/UL (ref 4.2–5.9)
SLIDE REVIEW: ABNORMAL
SODIUM BLD-SCNC: 139 MMOL/L (ref 136–145)
TOXIC GRANULATION: PRESENT
VACUOLATED NEUTROPHILS: PRESENT
WBC # BLD: 13.3 K/UL (ref 4–11)

## 2020-02-14 PROCEDURE — 1200000000 HC SEMI PRIVATE

## 2020-02-14 PROCEDURE — 83605 ASSAY OF LACTIC ACID: CPT

## 2020-02-14 PROCEDURE — 83735 ASSAY OF MAGNESIUM: CPT

## 2020-02-14 PROCEDURE — 85025 COMPLETE CBC W/AUTO DIFF WBC: CPT

## 2020-02-14 PROCEDURE — 97530 THERAPEUTIC ACTIVITIES: CPT

## 2020-02-14 PROCEDURE — 36415 COLL VENOUS BLD VENIPUNCTURE: CPT

## 2020-02-14 PROCEDURE — 99233 SBSQ HOSP IP/OBS HIGH 50: CPT | Performed by: INTERNAL MEDICINE

## 2020-02-14 PROCEDURE — 97110 THERAPEUTIC EXERCISES: CPT

## 2020-02-14 PROCEDURE — 6370000000 HC RX 637 (ALT 250 FOR IP): Performed by: INTERNAL MEDICINE

## 2020-02-14 PROCEDURE — 84100 ASSAY OF PHOSPHORUS: CPT

## 2020-02-14 PROCEDURE — 80048 BASIC METABOLIC PNL TOTAL CA: CPT

## 2020-02-14 PROCEDURE — 97116 GAIT TRAINING THERAPY: CPT

## 2020-02-14 PROCEDURE — 94760 N-INVAS EAR/PLS OXIMETRY 1: CPT

## 2020-02-14 PROCEDURE — 97535 SELF CARE MNGMENT TRAINING: CPT

## 2020-02-14 PROCEDURE — 2700000000 HC OXYGEN THERAPY PER DAY

## 2020-02-14 PROCEDURE — 2580000003 HC RX 258: Performed by: INTERNAL MEDICINE

## 2020-02-14 PROCEDURE — 6360000002 HC RX W HCPCS: Performed by: INTERNAL MEDICINE

## 2020-02-14 PROCEDURE — 2500000003 HC RX 250 WO HCPCS: Performed by: INTERNAL MEDICINE

## 2020-02-14 RX ORDER — PHENYTOIN SODIUM 100 MG/1
200 CAPSULE, EXTENDED RELEASE ORAL EVERY MORNING
Status: DISCONTINUED | OUTPATIENT
Start: 2020-02-15 | End: 2020-02-16 | Stop reason: HOSPADM

## 2020-02-14 RX ORDER — PHENYTOIN SODIUM 100 MG/1
100 CAPSULE, EXTENDED RELEASE ORAL 3 TIMES DAILY
Status: DISCONTINUED | OUTPATIENT
Start: 2020-02-14 | End: 2020-02-16 | Stop reason: HOSPADM

## 2020-02-14 RX ORDER — AMLODIPINE BESYLATE 10 MG/1
10 TABLET ORAL DAILY
Status: DISCONTINUED | OUTPATIENT
Start: 2020-02-14 | End: 2020-02-16 | Stop reason: HOSPADM

## 2020-02-14 RX ADMIN — PIPERACILLIN AND TAZOBACTAM 3.38 G: 3; .375 INJECTION, POWDER, LYOPHILIZED, FOR SOLUTION INTRAVENOUS at 11:50

## 2020-02-14 RX ADMIN — METOPROLOL SUCCINATE 50 MG: 50 TABLET, EXTENDED RELEASE ORAL at 09:13

## 2020-02-14 RX ADMIN — SODIUM CHLORIDE, PRESERVATIVE FREE 10 ML: 5 INJECTION INTRAVENOUS at 09:14

## 2020-02-14 RX ADMIN — PHENYTOIN SODIUM 100 MG: 100 CAPSULE ORAL at 11:50

## 2020-02-14 RX ADMIN — SODIUM CHLORIDE, PRESERVATIVE FREE 10 ML: 5 INJECTION INTRAVENOUS at 20:31

## 2020-02-14 RX ADMIN — PIPERACILLIN AND TAZOBACTAM 3.38 G: 3; .375 INJECTION, POWDER, LYOPHILIZED, FOR SOLUTION INTRAVENOUS at 03:56

## 2020-02-14 RX ADMIN — PIPERACILLIN AND TAZOBACTAM 3.38 G: 3; .375 INJECTION, POWDER, LYOPHILIZED, FOR SOLUTION INTRAVENOUS at 20:30

## 2020-02-14 RX ADMIN — PRAVASTATIN SODIUM 40 MG: 40 TABLET ORAL at 20:31

## 2020-02-14 RX ADMIN — LABETALOL HYDROCHLORIDE 10 MG: 5 INJECTION INTRAVENOUS at 14:32

## 2020-02-14 RX ADMIN — PHENYTOIN SODIUM 100 MG: 100 CAPSULE ORAL at 14:33

## 2020-02-14 RX ADMIN — PHENYTOIN SODIUM 100 MG: 100 CAPSULE ORAL at 20:31

## 2020-02-14 RX ADMIN — ASPIRIN 81 MG: 81 TABLET, COATED ORAL at 09:13

## 2020-02-14 RX ADMIN — AMLODIPINE BESYLATE 10 MG: 10 TABLET ORAL at 16:18

## 2020-02-14 ASSESSMENT — PAIN SCALES - GENERAL
PAINLEVEL_OUTOF10: 0

## 2020-02-14 NOTE — PROGRESS NOTES
Office: [phone]       Fax: [phone]      Nephrology Progress Note        Patient's Name: Willy Song Date: 2/10/2020  Date of Visit: 2/14/2020    Reason for Consult:  LEE      Subjective:      Aminta Mercer is a 80 y.o. male with PMHx of hypertension, AAA status post repair 2016, DM, seizure, COPD, CVA, bladder cancer status post nephrectomy (details not known), CKD  who was hospitalized on 2/10/2020 after transfer from [place] [address] where he presented with syncope and elevated trop. Initial creat at [facility] was 1. 3. he underwent cardiac cath (70 ml iv contrast) on 2/10 that showed \"Single-vessel coronary artery disease with subtotal occlusion of mid right coronary artery\". Patient is on medical management. Creat noted to trend to 1.4 then 2.3. IV Vanco given 2/11, Zosyn on 2.12. he also received Ibuprofen 600 mg on 2/11. Has fair UO (not quantified). Reported history of renal stone in past. No current hematuria. Denies previous NSAID use. Patient is not good historian and tends to forget. Interval History  Feels better  Fair UO  Creat improving     I/O last 3 completed shifts: In: 12 [P.O.:360; IV Piggyback:200]  Out: 2 [Urine:1; Emesis/NG output:1]  No intake/output data recorded. Medications: Allergies:  Patient has no known allergies.     Scheduled Meds:   [START ON 2/15/2020] phenytoin  200 mg Oral QAM    phenytoin  100 mg Oral TID    piperacillin-tazobactam  3.375 g Intravenous Q8H    metoprolol succinate  50 mg Oral Daily    pravastatin  40 mg Oral Nightly    aspirin  81 mg Oral Daily    sodium chloride flush  10 mL Intravenous 2 times per day     Continuous Infusions:      Labs:  CBC:   Recent Labs     02/12/20  0457 02/13/20  0447 02/14/20  0519   WBC 19.2* 13.3* 13.3*   HGB 12.9* 11.3* 11.6*   PLT 62* 51* 68* Ca/Mg/Phos:   Recent Labs     02/12/20  0457 02/13/20  0447 02/14/20  0519   CALCIUM 8.6 8.6 9.0   MG 1.70* 1.80 2.10   PHOS 4.8 2.9 2.4*     UA:  Recent Labs     02/12/20  1635   COLORU DK YELLOW   CLARITYU CLOUDY*   GLUCOSEU Negative   BILIRUBINUR Negative   KETUA TRACE*   SPECGRAV 1.029   BLOODU MODERATE*   PHUR 5.0   PROTEINU 100*   UROBILINOGEN 0.2   NITRU Negative   LEUKOCYTESUR Negative   LABMICR YES   URINETYPE NotGiven      Urine Microscopic:   Recent Labs     02/12/20  1635   BACTERIA RARE*   HYALCAST 10*   WBCUA 8*   RBCUA 2   EPIU 2     Urine Chemistry:   Recent Labs     02/12/20  1635   LABCREA 188.1       Objective:   Vitals: BP (!) 189/91   Pulse 80   Temp 97.8 °F (36.6 °C) (Oral)   Resp 17   Ht 5' 8\" (1.727 m)   Wt 173 lb 15.1 oz (78.9 kg)   SpO2 97%   BMI 26.45 kg/m²    Wt Readings from Last 3 Encounters:   02/14/20 173 lb 15.1 oz (78.9 kg)   01/18/18 201 lb (91.2 kg)   10/26/17 205 lb (93 kg)      24HR INTAKE/OUTPUT:      Intake/Output Summary (Last 24 hours) at 2/14/2020 1441  Last data filed at 2/14/2020 1200  Gross per 24 hour   Intake 560 ml   Output 2 ml   Net 558 ml     Constitutional:  awake, mild distress  HEENT:  MMM, No icterus  Neck: no bruits, No JVD  Cardiovascular:  S1, S2 reg  Respiratory: harsh ves breathing  Abdomen:  +BS, soft, NT, ND  Ext: trace lower extremity edema  Psychiatric: mood and affect appropriate  Skin: dry/intact  CNS: alert, no agitation    IMAGING:  CT ABDOMEN PELVIS WO CONTRAST Additional Contrast? None   Final Result   1. No CT evidence of an acute intra-abdominal or intrapelvic process. No   specific finding to suggest etiology for E. coli bacteremia. 2. Cholelithiasis without findings of acute cholecystitis. 3. 5.4 cm AAA, status post endovascular stent graft exclusion. I have no   prior study for comparison. 4.  Diverticulosis coli without CT evidence of acute diverticulitis. 5. Punctate nonobstructing calculus inferior pole right kidney. [facility] [address]  Office : [phone]  Fax :[phone]

## 2020-02-14 NOTE — PROGRESS NOTES
CHEST PORTABLE   Final Result   Cardiomegaly and mild pulmonary vascular congestion. Assessment/Plan:    Active Hospital Problems    Diagnosis    Mental status change resolved [Z86.59]            NSTEMI - cardio following, s/p cath with occlusion but no PCI performed, medical mgmt     Presented with syncope - suspect related to NSTEMI     Sepsis/Fever - suspect 2/2 bacteremia, reviewed CT chest/UA, started on cefepime/vanco-->zosyn, blood cx with e coli, CT abd reviewed     E coli bacteremia - cont IV abx, recheck blood cx neg to date, pansensitive. If plan is d/c to SNF, will place PICC, if plan home on d/c, will cont another 2 days of IV abx, monitor PCT and WBC     ARF on CKD II - baseline creat 1.2-1.3, creat improved from 2.3-->2.1-->1.7 today, suspect pre-renal vs ATN from hypotension, also had contrast with cath. Monitor creat, nephrology consulted.  Pt has h/o nephrectomy - has a single kidney     Lactic acidosis - suspect 2/2 sepsis, on IVF, resolved     HTN - controlled      Suspected early dementia/acute metabolic encephalopathy - suspect 2/2 above, improving        DVT Prophylaxis: SCD  Diet: DIET CARDIAC;  Code Status: Full Code    PT/OT Eval Status: ordered, rec SNF but pt and family want pt to go home    Dispo - cont care, will cont 2 more days IV abx since pt and son want pt to come home on d/c    Nadia Lee MD

## 2020-02-14 NOTE — PROGRESS NOTES
Occupational Therapy  Facility/Department: [address] ORTHOPEDICS  Daily Treatment Note  NAME: Candace Lara  : 1934  MRN: [id number]    Date of Service: 2020    Discharge Recommendations:  3-5 sessions per week, Patient would benefit from continued therapy after discharge       Candace Lara scored a 17/24 on the AM-PAC ADL Inpatient form. Current research shows that an AM-PAC score of 17 or less is typically not associated with a discharge to the patient's home setting. Based on the patients AM-PAC score and their current ADL deficits, it is recommended that the patient have 3-5 sessions per week of Occupational Therapy at d/c to increase the patients independence. Assessment: Discussed with OTR am pac score is 17 which indicates need for continued skilled OT to increase Harlan and decreacse cargiver burden. Patient declined functional mobility this date. Seated in recliner chair washed face and hands after set up. Completed AROM of B UE's 10 reps with cues to stay on task. Continue with POC. Patient Diagnosis(es): There were no encounter diagnoses. 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 has a past surgical history that includes bladder tumor excision; Nephrostomy; hernia repair; total nephrectomy (Left, ); Skin cancer excision (10/15/2015);  Cataract removal with implant (Bilateral); and AAA repair, endovascular

## 2020-02-14 NOTE — PROGRESS NOTES
Physical Therapy  Facility/Department: [address] ORTHOPEDICS  Daily Treatment Note  This note serves as patient discharge summary if pt discharges prior to next PT visit    NAME: Linden Patrick  : 1934  MRN: [id number]    Date of Service: 2020    Discharge Recommendations:  Patient would benefit from continued therapy after discharge, 2-3 sessions per week, 24 hour supervision or assist, Home with Home health PT   Linden Patrick scored a 16/24 on the AM-PAC short mobility form. Current research shows that an AM-PAC score of 18 or greater is typically associated with a discharge to the patient's home setting. Based on the patients AM-PAC score and their current functional mobility deficits, it is recommended that the patient have 2-3 sessions per week of Physical Therapy at d/c to increase the patients independence. PT Equipment Recommendations  Equipment Needed: No(pt owns RW )    Assessment   Body structures, Functions, Activity limitations: Decreased functional mobility ; Decreased balance;Decreased strength  Assessment: Per H&P, \"This is a 80 y.o. male with history of abdominal aortic aneurysm repair, seizure disorder, diabetes, nephrectomy bladder cancer who came [facility] with a questionable syncopal episode. The patient denies it he says he deftly did not have a seizure. His initial EKG shows old inferior infarct with Q waves in the inferior leads with some slight ST elevations. There were also nonspecific ST depressions throughout. Troponins are just borderline elevated 0.11. He never had chest pain and continues to deny it. \" PMHx: seizures, HLD, HTn, GERD, DM, COPD, bladder CA, AAA repair. \" Today, pt voiced frustration that he is not going home today. PT explained the importance to make sure the pt is both functionally and medically safe prior to going home. Pt and family members all voiced understanding and agreed.  The pt was more oriented today, as compared o yesterday, only disoriented to situation. Pt's spO2 was 96% seated on 3L O2. Pt required Jolly for sit to stand toilet to RW and CGA for recliner to RW. Pt used restroom during session with CGA to wash hands at sink and performed self pericare. PT noted some reddness on pt's buttox and notfied nursing, who treated the reddness while pt stood with RW with CGA, during session. PT educated pt and family on the importance that the pt use a RW, instead of cane, for safety. Pt and family admant that pt return home on d/c due to pt functioning \"near baseline\" (per family) and the pt has 24 hour assist at home (per family). PT also educated pt and family on the importance of continued skilled care at home to improve pt's functional mobility. Pt tends to be impulsive during all functional mobility and continues to be a fall risk, which PT and family discussed. All functional mobility during session slow and effortful with RW. PT recommends continued skilled therapy, 2-3 sessions per week S1 level home care, with 24 hour assist.   Treatment Diagnosis: impaired transfers  Prognosis: Fair  Decision Making: Medium Complexity  PT Education: Goals;PT Role;General Safety;Plan of Care;Orientation; Family Education;Transfer Training;Functional Mobility Training;Gait Training  Patient Education: pt verbalized understanding but would benefit from reinforcement  REQUIRES PT FOLLOW UP: Yes  Activity Tolerance  Activity Tolerance: Patient limited by fatigue;Patient limited by endurance; Patient limited by cognitive status  Activity Tolerance: Pt is impulsive with all fucntional mobility      Patient Diagnosis(es): There were no encounter diagnoses.      has a past medical history of Arthritis, Basal ganglia infarction ([address] Bladder cancer ([address] Cataract, Chronic ethmoidal sinusitis, COPD (chronic obstructive pulmonary disease) ([address] Diabetes mellitus ([address] Gas pain, GERD (gastroesophageal reflux disease), History of nephrectomy, History of renal calculi, HTN (hypertension), Hyperinflation of lungs, Hyperlipidemia, Hypoplasia of one kidney, Seizures ([address] Sleep disorder, Squamous cell carcinoma of skin of right arm, including shoulder, and Trigeminal neuralgia. has a past surgical history that includes bladder tumor excision; Nephrostomy; hernia repair; total nephrectomy (Left, 1994); Skin cancer excision (10/15/2015); Cataract removal with implant (Bilateral); and AAA repair, endovascular (4/27/16). Restrictions  Restrictions/Precautions  Restrictions/Precautions: Fall Risk  Position Activity Restriction  Other position/activity restrictions: 3L O2  Subjective   General  Chart Reviewed: Yes  Additional Pertinent Hx: Per H&P, \"This is a 80 y.o. male with history of abdominal aortic aneurysm repair, seizure disorder, diabetes, nephrectomy bladder cancer who came [facility] with a questionable syncopal episode. The patient denies it he says he deftly did not have a seizure. His initial EKG shows old inferior infarct with Q waves in the inferior leads with some slight ST elevations. There were also nonspecific ST depressions throughout. Troponins are just borderline elevated 0.11. He never had chest pain and continues to deny it. \" PMHx: seizures, HLD, HTn, GERD, DM, COPD, bladder CA, AAA repair  Response To Previous Treatment: Patient with no complaints from previous session. Family / Caregiver Present: Yes(grandson and son)  Referring Practitioner: Karyle Anon, MD  Subjective  Subjective: Pt stated he is frustrated that he is not going home today, but otherwise, pt feels fine. General Comment  Comments: Pt seated in recliner upon PT arrival. Agreeable to PT. Orientation  Orientation  Overall Orientation Status: Impaired  Orientation Level: Oriented to person;Oriented to place; Disoriented to situation;Oriented to time(Pt more oriented today,as compared to, yesterday.  However, pt still disoriented to situation )  Objective   Bed mobility  Comment: Pt seated in recliner at beginning and end of session   Transfers  Sit to Stand: Minimal Assistance;Contact guard assistance(Jolly from toilet and CGA from recliner both to RW)  Stand to sit: Contact guard assistance(Rw to toilet/ recliner)  Ambulation  Ambulation?: Yes  Ambulation 1  Surface: level tile  Device: Rolling Walker  Other Apparatus: O2(O2 3L and IV. PT managed both lines during session. )  Assistance: Contact guard assistance  Quality of Gait: Pt leans forward significantly on RW during gait, due to poor posture. Gait Deviations: Slow [place];Decreased step height;Decreased step length;Shuffles  Distance: 30' +30'   Balance  Sitting - Static: Good;-  Sitting - Dynamic: Good;-  Standing - Static: Fair(with RW )  Standing - Dynamic: Fair(with RW )    AM-PAC Score  AM-PAC Inpatient Mobility Raw Score : 16 (02/14/20 1618)  AM-PAC Inpatient T-Scale Score : 40.78 (02/14/20 1618)  Mobility Inpatient CMS 0-100% Score: 54.16 (02/14/20 1618)  Mobility Inpatient CMS G-Code Modifier : CK (02/14/20 1618)     Goals  Short term goals  Time Frame for Short term goals: 5 days  Short term goal 1: Pt will perform bed mobility with supervision  Short term goal 2: Pt will perform transfers with min A. Goal met 2/14  Short term goal 3: Pt will ambulate 48' with RW and min A  Patient Goals   Patient goals : \"to go back home\"    Plan    Plan  Times per week: 3-5x/wk  Current Treatment Recommendations: Strengthening, Balance Training, Functional Mobility Training, Transfer Training, Gait Training, Patient/Caregiver Education & Training  Safety Devices  Type of devices:  All fall risk precautions in place, Call light within reach, Gait belt, Patient at risk for falls, Left in chair, Chair alarm in place, Nurse notified     Therapy Time   Individual Concurrent Group Co-treatment   Time In 9774         Time Out 1553         Minutes 605 Jamie Paez, Student Physical Therapist   I attest that I was present for and made a skilled & mindful clinical judgement during the evaluation and/or treatment of this patient on 2/14/2020  Electronically signed by Aramis Osorio, PT [id number] on 2/14/2020 at 4:39 PM

## 2020-02-14 NOTE — PROGRESS NOTES
Pt is currently awake in chair. No complaints at this time. AM medications given without complications. Fall precautions in place. Call light is within reach. Will continue to monitor.  Electronically signed by Maryetta Lombard on 2/14/2020 at 9:26 AM

## 2020-02-15 LAB
ANION GAP SERPL CALCULATED.3IONS-SCNC: 13 MMOL/L (ref 3–16)
BANDED NEUTROPHILS RELATIVE PERCENT: 2 % (ref 0–7)
BASOPHILS ABSOLUTE: 0 K/UL (ref 0–0.2)
BASOPHILS RELATIVE PERCENT: 0 %
BUN BLDV-MCNC: 33 MG/DL (ref 7–20)
CALCIUM SERPL-MCNC: 8.9 MG/DL (ref 8.3–10.6)
CHLORIDE BLD-SCNC: 104 MMOL/L (ref 99–110)
CO2: 24 MMOL/L (ref 21–32)
CREAT SERPL-MCNC: 1.5 MG/DL (ref 0.8–1.3)
CULTURE, BLOOD 2: NORMAL
EOSINOPHILS ABSOLUTE: 0.2 K/UL (ref 0–0.6)
EOSINOPHILS RELATIVE PERCENT: 2 %
GFR AFRICAN AMERICAN: 54
GFR NON-AFRICAN AMERICAN: 44
GLUCOSE BLD-MCNC: 97 MG/DL (ref 70–99)
HCT VFR BLD CALC: 35.3 % (ref 40.5–52.5)
HEMOGLOBIN: 12.2 G/DL (ref 13.5–17.5)
LACTIC ACID: 0.9 MMOL/L (ref 0.4–2)
LYMPHOCYTES ABSOLUTE: 0.7 K/UL (ref 1–5.1)
LYMPHOCYTES RELATIVE PERCENT: 8 %
MAGNESIUM: 2 MG/DL (ref 1.8–2.4)
MCH RBC QN AUTO: 33.4 PG (ref 26–34)
MCHC RBC AUTO-ENTMCNC: 34.6 G/DL (ref 31–36)
MCV RBC AUTO: 96.6 FL (ref 80–100)
METAMYELOCYTES RELATIVE PERCENT: 1 %
MONOCYTES ABSOLUTE: 0.8 K/UL (ref 0–1.3)
MONOCYTES RELATIVE PERCENT: 9 %
NEUTROPHILS ABSOLUTE: 7.3 K/UL (ref 1.7–7.7)
NEUTROPHILS RELATIVE PERCENT: 78 %
PDW BLD-RTO: 14.7 % (ref 12.4–15.4)
PHOSPHORUS: 2.7 MG/DL (ref 2.5–4.9)
PLATELET # BLD: 82 K/UL (ref 135–450)
PMV BLD AUTO: 8.8 FL (ref 5–10.5)
POTASSIUM SERPL-SCNC: 3.6 MMOL/L (ref 3.5–5.1)
PROCALCITONIN: 2.66 NG/ML (ref 0–0.15)
RBC # BLD: 3.66 M/UL (ref 4.2–5.9)
RBC # BLD: NORMAL 10*6/UL
SODIUM BLD-SCNC: 141 MMOL/L (ref 136–145)
WBC # BLD: 9 K/UL (ref 4–11)

## 2020-02-15 PROCEDURE — 6370000000 HC RX 637 (ALT 250 FOR IP): Performed by: INTERNAL MEDICINE

## 2020-02-15 PROCEDURE — 80048 BASIC METABOLIC PNL TOTAL CA: CPT

## 2020-02-15 PROCEDURE — 84145 PROCALCITONIN (PCT): CPT

## 2020-02-15 PROCEDURE — 2580000003 HC RX 258: Performed by: INTERNAL MEDICINE

## 2020-02-15 PROCEDURE — 36415 COLL VENOUS BLD VENIPUNCTURE: CPT

## 2020-02-15 PROCEDURE — 97116 GAIT TRAINING THERAPY: CPT

## 2020-02-15 PROCEDURE — 84100 ASSAY OF PHOSPHORUS: CPT

## 2020-02-15 PROCEDURE — 2500000003 HC RX 250 WO HCPCS: Performed by: INTERNAL MEDICINE

## 2020-02-15 PROCEDURE — 83735 ASSAY OF MAGNESIUM: CPT

## 2020-02-15 PROCEDURE — 85025 COMPLETE CBC W/AUTO DIFF WBC: CPT

## 2020-02-15 PROCEDURE — 1200000000 HC SEMI PRIVATE

## 2020-02-15 PROCEDURE — 83605 ASSAY OF LACTIC ACID: CPT

## 2020-02-15 PROCEDURE — 97110 THERAPEUTIC EXERCISES: CPT

## 2020-02-15 PROCEDURE — 6360000002 HC RX W HCPCS: Performed by: INTERNAL MEDICINE

## 2020-02-15 RX ORDER — AMLODIPINE BESYLATE 10 MG/1
10 TABLET ORAL DAILY
Qty: 30 TABLET | Refills: 3 | Status: SHIPPED | OUTPATIENT
Start: 2020-02-16 | End: 2020-06-29

## 2020-02-15 RX ORDER — HYDRALAZINE HYDROCHLORIDE 20 MG/ML
10 INJECTION INTRAMUSCULAR; INTRAVENOUS EVERY 6 HOURS PRN
Status: DISCONTINUED | OUTPATIENT
Start: 2020-02-15 | End: 2020-02-16 | Stop reason: HOSPADM

## 2020-02-15 RX ADMIN — SODIUM CHLORIDE, PRESERVATIVE FREE 10 ML: 5 INJECTION INTRAVENOUS at 20:43

## 2020-02-15 RX ADMIN — PHENYTOIN SODIUM 100 MG: 100 CAPSULE ORAL at 13:11

## 2020-02-15 RX ADMIN — AMLODIPINE BESYLATE 10 MG: 10 TABLET ORAL at 08:02

## 2020-02-15 RX ADMIN — ASPIRIN 81 MG: 81 TABLET, COATED ORAL at 08:02

## 2020-02-15 RX ADMIN — PIPERACILLIN AND TAZOBACTAM 3.38 G: 3; .375 INJECTION, POWDER, LYOPHILIZED, FOR SOLUTION INTRAVENOUS at 13:11

## 2020-02-15 RX ADMIN — PIPERACILLIN AND TAZOBACTAM 3.38 G: 3; .375 INJECTION, POWDER, LYOPHILIZED, FOR SOLUTION INTRAVENOUS at 20:43

## 2020-02-15 RX ADMIN — PRAVASTATIN SODIUM 40 MG: 40 TABLET ORAL at 20:43

## 2020-02-15 RX ADMIN — PIPERACILLIN AND TAZOBACTAM 3.38 G: 3; .375 INJECTION, POWDER, LYOPHILIZED, FOR SOLUTION INTRAVENOUS at 04:23

## 2020-02-15 RX ADMIN — METOPROLOL SUCCINATE 50 MG: 50 TABLET, EXTENDED RELEASE ORAL at 08:02

## 2020-02-15 RX ADMIN — PHENYTOIN SODIUM 100 MG: 100 CAPSULE ORAL at 17:27

## 2020-02-15 RX ADMIN — PHENYTOIN SODIUM 100 MG: 100 CAPSULE ORAL at 20:43

## 2020-02-15 RX ADMIN — LABETALOL HYDROCHLORIDE 10 MG: 5 INJECTION INTRAVENOUS at 10:42

## 2020-02-15 RX ADMIN — PHENYTOIN SODIUM 200 MG: 100 CAPSULE ORAL at 08:02

## 2020-02-15 ASSESSMENT — PAIN SCALES - GENERAL
PAINLEVEL_OUTOF10: 0
PAINLEVEL_OUTOF10: 0

## 2020-02-15 NOTE — PROGRESS NOTES
Physical Therapy    Facility/Department: [address] ORTHOPEDICS  Daily Note  If pt. is D/C'd prior to next visit please refer back to last daily progress note for D/C status. NAME: Kady Alexander  : 1934  MRN: [id number]    Date of Service: 2/15/2020    Discharge Recommendations:  Patient would benefit from continued therapy after discharge, 2-3 sessions per week, 24 hour supervision or assist, Home with Home health PT, S Level 1   Kady Alexander scored a 16/24 on the AM-PAC short mobility form. Current research shows that an AM-PAC score of 18 or greater is typically associated with a discharge to the patient's home setting. Based on the patients AM-PAC score and their current functional mobility deficits, it is recommended that the patient have 2-3 sessions per week of Physical Therapy at d/c to increase the patients independence. PT Equipment Recommendations  Equipment Needed: No(pt owns RW )    Assessment   Body structures, Functions, Activity limitations: Decreased functional mobility ; Decreased balance;Decreased strength  Assessment: Per H&P, \"This is a 80 y.o. male with history of abdominal aortic aneurysm repair, seizure disorder, diabetes, nephrectomy bladder cancer who came [facility] with a questionable syncopal episode. The patient denies it he says he deftly did not have a seizure. His initial EKG shows old inferior infarct with Q waves in the inferior leads with some slight ST elevations. There were also nonspecific ST depressions throughout. Troponins are just borderline elevated 0.11. He never had chest pain and continues to deny it. \" PMHx: seizures, HLD, HTn, GERD, DM, COPD, bladder CA, AAA repair. \" Today(2-), the pt required CGA for sit to stand to RW. The pt was able to ambulate with the wheeled walker 50 feet x 2 with CGA/SBA and completed seated B LE exercises with cues.  On 2020: PT educated pt and family on the importance that the pt use a Attends with cues to redirect  Memory: Decreased recall of biographical Information;Decreased recall of recent events;Decreased short term memory  Safety Judgement: Decreased awareness of need for assistance;Decreased awareness of need for safety  Problem Solving: Assistance required to generate solutions;Assistance required to correct errors made  Insights: Decreased awareness of deficits  Initiation: Requires cues for some  Sequencing: Requires cues for some    Objective  Bed mobility  Comment: Pt seated in recliner at beginning and end of session   Transfers  Sit to Stand: Contact guard assistance  Stand to sit: Contact guard assistance  Ambulation  Ambulation?: Yes  Ambulation 1  Surface: level tile  Device: Rolling Walker  Other Apparatus: O2(2L O2, line and tank managed by therapist)  Assistance: Contact guard assistance;Stand by assistance  Quality of Gait: tends to walk with shuffled steps B and behind the frame of the walker but is able to manage the walker on turns, no LOB  Distance: 50 feet x 2  Stairs/Curb  Stairs?: No     Balance  Sitting - Static: Good  Sitting - Dynamic: Good;-(tends to lean to the R in the chair)  Standing - Static: Good;-  Standing - Dynamic: Good;-(with walker)  Exercises  Hip Flexion: marching x 10 reps B  Knee Long Arc Quad: x 10 reps B  Ankle Pumps: x 10 reps B  Comments: poor quality of exercises and the pt needed constant cues to slow down and complete correctly, he is distractable     Plan   Plan  Times per week: 3-5x/wk  Current Treatment Recommendations: Strengthening, Balance Training, Functional Mobility Training, Transfer Training, Gait Training, Patient/Caregiver Education & Training  Safety Devices  Type of devices:  All fall risk precautions in place, Call light within reach, Gait belt, Patient at risk for falls, Left in chair, Chair alarm in place, Nurse notified(Alanna Epstein, RN aware)      AM-PAC Score  AM-PAC Inpatient Mobility Raw Score : 16 (02/15/20 1018)  AM-PAC Inpatient T-Scale Score : 40.78 (02/15/20 1018)  Mobility Inpatient CMS 0-100% Score: 54.16 (02/15/20 1018)  Mobility Inpatient CMS G-Code Modifier : CK (02/15/20 1018)          Goals  Short term goals  Time Frame for Short term goals: 5 days  Short term goal 1: Pt will perform bed mobility with supervision  Short term goal 2: Pt will perform transfers with min A. Goal met 2/14  Short term goal 3: Pt will ambulate 48' with RW and min A  (met, 2-15)  Patient Goals   Patient goals : \"to go back home\"       Therapy Time   Individual Concurrent Group Co-treatment   Time In 0945         Time Out 1027         Minutes 42               Electronically signed by Ashlie Vera, PT  [id number] on 2/15/2020 at 10:31 AM

## 2020-02-15 NOTE — PROGRESS NOTES
Office: [phone]       Fax: [phone]      Nephrology Progress Note        Patient's Name: Sonya Cash Date: 2/10/2020  Date of Visit: 2/15/2020    Reason for Consult:  LEE      Subjective:      Randall Cox is a 80 y.o. male with PMHx of hypertension, AAA status post repair 2016, DM, seizure, COPD, CVA, bladder cancer status post nephrectomy (details not known), CKD  who was hospitalized on 2/10/2020 after transfer from [place] [address] where he presented with syncope and elevated trop. Initial creat at [facility] was 1. 3. he underwent cardiac cath (70 ml iv contrast) on 2/10 that showed \"Single-vessel coronary artery disease with subtotal occlusion of mid right coronary artery\". Patient is on medical management. Creat noted to trend to 1.4 then 2.3. IV Vanco given 2/11, Zosyn on 2.12. he also received Ibuprofen 600 mg on 2/11. Has fair UO (not quantified). Reported history of renal stone in past. No current hematuria. Denies previous NSAID use. Patient is not good historian and tends to forget. Interval History  Feels better  Fair UO  Creat improving     I/O last 3 completed shifts: In: 240 [P.O.:240]  Out: 2 [Urine:1; Emesis/NG output:1]  I/O this shift:  In: 360 [P.O.:360]  Out: -         Medications: Allergies:  Patient has no known allergies.     Scheduled Meds:   phenytoin  200 mg Oral QAM    phenytoin  100 mg Oral TID    amLODIPine  10 mg Oral Daily    piperacillin-tazobactam  3.375 g Intravenous Q8H    metoprolol succinate  50 mg Oral Daily    pravastatin  40 mg Oral Nightly    aspirin  81 mg Oral Daily    sodium chloride flush  10 mL Intravenous 2 times per day     Continuous Infusions:      Labs:  CBC:   Recent Labs     02/13/20  0447 02/14/20  0519 02/15/20  0452   WBC 13.3* 13.3* 9.0   HGB 11.3* 11.6* 12.2*   PLT 51* 68* 82*     Ca/Mg/Phos:   Recent Labs     02/13/20  0447 02/14/20  0519 02/15/20  0452   CALCIUM 8.6 9.0 8.9   MG 1.80 2.10 2.00   PHOS 2.9 2.4* 2.7     UA:  Recent Labs     02/12/20  1635   COLORU DK YELLOW   CLARITYU CLOUDY*   GLUCOSEU Negative   BILIRUBINUR Negative   KETUA TRACE*   SPECGRAV 1.029   BLOODU MODERATE*   PHUR 5.0   PROTEINU 100*   UROBILINOGEN 0.2   NITRU Negative   LEUKOCYTESUR Negative   LABMICR YES   URINETYPE NotGiven      Urine Microscopic:   Recent Labs     02/12/20  1635   BACTERIA RARE*   HYALCAST 10*   WBCUA 8*   RBCUA 2   EPIU 2     Urine Chemistry:   Recent Labs     02/12/20  1635   LABCREA 188.1       Objective:   Vitals: BP (!) 158/96   Pulse 80   Temp 98.1 °F (36.7 °C) (Oral)   Resp 18   Ht 5' 8\" (1.727 m)   Wt 177 lb 0.5 oz (80.3 kg)   SpO2 97%   BMI 26.92 kg/m²    Wt Readings from Last 3 Encounters:   02/15/20 177 lb 0.5 oz (80.3 kg)   01/18/18 201 lb (91.2 kg)   10/26/17 205 lb (93 kg)      24HR INTAKE/OUTPUT:      Intake/Output Summary (Last 24 hours) at 2/15/2020 1227  Last data filed at 2/15/2020 1207  Gross per 24 hour   Intake 480 ml   Output --   Net 480 ml     Constitutional:  awake, mild distress  HEENT:  MMM, No icterus  Neck: no bruits, No JVD  Cardiovascular:  S1, S2 reg  Respiratory: harsh ves breathing  Abdomen:  +BS, soft, NT, ND  Ext: trace lower extremity edema  Psychiatric: mood and affect appropriate  Skin: dry/intact  CNS: alert, no agitation    IMAGING:  CT ABDOMEN PELVIS WO CONTRAST Additional Contrast? None   Final Result   1. No CT evidence of an acute intra-abdominal or intrapelvic process. No   specific finding to suggest etiology for E. coli bacteremia. 2. Cholelithiasis without findings of acute cholecystitis. 3. 5.4 cm AAA, status post endovascular stent graft exclusion. I have no   prior study for comparison. 4.  Diverticulosis coli without CT evidence of acute diverticulitis. 5. Punctate nonobstructing calculus inferior pole right kidney. Status post   left nephrectomy. 6. Prostate gland enlargement typical of BPH. 7. Small volume bilateral pleural effusion with minimal bibasilar relaxation   atelectasis. CT CHEST WO CONTRAST   Final Result   Trace bilateral pleural effusions. Patchy ground-glass opacity bilaterally   can be on the basis of atelectasis, mild edema, or pneumonitis. Sequela of granulomatous disease. Several noncalcified pulmonary nodules are   also seen which are technically indeterminate, the largest within the right   perihilar region measuring approximately 1.5 cm. Enlarged AP window lymph   node is also seen. Continued follow-up after treatment and resolution of any   acute symptoms is recommended. XR CHEST PORTABLE   Final Result   Cardiomegaly and mild pulmonary vascular congestion. Assessment :     1. LEE on CKD 3  -Non-Oliguric  -Baseline creat: 1.2-1.3   Now 1.4->2.3->2.1---> 1.7 ---> 1.5  -single kidney. Recent contrast, NSAID use, hypotension  -Volume: euvol to mild hypervol  -Electrolytes/Acid-Base: no dyskalemia. HypoMg, AGMA    Recent Labs     02/13/20  0447 02/14/20  0519 02/15/20  0452   BUN 45* 42* 33*   CREATININE 2.1* 1.7* 1.5*     Recent Labs     02/13/20  0447 02/14/20  0519 02/15/20  0452    139 141   K 3.5 3.7 3.6   CO2 20* 21 24   MG 1.80 2.10 2.00       2. HTN  -blood pressure high  - Add amlodipine 10 mg po daily   Weight: 177 lb 0.5 oz (80.3 kg), BP: (!) 158/96    3. CAD  -status post cardiac cath (2/10) on medical management  -TTE (feb 2020): mild LVH, LVEF 50-55%    4. COPD    Plan:     -No NSAID  -Hold ACEI/ARB for now  -supplement Mg as needed  -Hemodynamic support to maintain MAP>65      -Monitor I/O, UOP  -Avoid nephrotoxin, if able.   -Dose meds to current eGFR      Compa Swain MD   2/15/2020    [facility] [address]  Office : [phone]  Fax :[phone]

## 2020-02-16 VITALS
HEART RATE: 75 BPM | DIASTOLIC BLOOD PRESSURE: 78 MMHG | WEIGHT: 179.68 LBS | OXYGEN SATURATION: 96 % | SYSTOLIC BLOOD PRESSURE: 164 MMHG | TEMPERATURE: 98.1 F | RESPIRATION RATE: 18 BRPM | HEIGHT: 68 IN | BODY MASS INDEX: 27.23 KG/M2

## 2020-02-16 LAB
ANION GAP SERPL CALCULATED.3IONS-SCNC: 12 MMOL/L (ref 3–16)
ATYPICAL LYMPHOCYTE RELATIVE PERCENT: 1 % (ref 0–6)
BANDED NEUTROPHILS RELATIVE PERCENT: 3 % (ref 0–7)
BASOPHILS ABSOLUTE: 0 K/UL (ref 0–0.2)
BASOPHILS RELATIVE PERCENT: 0 %
BLOOD CULTURE, ROUTINE: NORMAL
BUN BLDV-MCNC: 23 MG/DL (ref 7–20)
CALCIUM SERPL-MCNC: 8.8 MG/DL (ref 8.3–10.6)
CHLORIDE BLD-SCNC: 106 MMOL/L (ref 99–110)
CO2: 24 MMOL/L (ref 21–32)
CREAT SERPL-MCNC: 1.3 MG/DL (ref 0.8–1.3)
CULTURE, BLOOD 2: NORMAL
DOHLE BODIES: PRESENT
EOSINOPHILS ABSOLUTE: 0.5 K/UL (ref 0–0.6)
EOSINOPHILS RELATIVE PERCENT: 9 %
GFR AFRICAN AMERICAN: >60
GFR NON-AFRICAN AMERICAN: 52
GLUCOSE BLD-MCNC: 106 MG/DL (ref 70–99)
HCT VFR BLD CALC: 34.3 % (ref 40.5–52.5)
HEMOGLOBIN: 11.8 G/DL (ref 13.5–17.5)
LACTIC ACID: 1.6 MMOL/L (ref 0.4–2)
LYMPHOCYTES ABSOLUTE: 0.7 K/UL (ref 1–5.1)
LYMPHOCYTES RELATIVE PERCENT: 12 %
MAGNESIUM: 1.9 MG/DL (ref 1.8–2.4)
MCH RBC QN AUTO: 33.3 PG (ref 26–34)
MCHC RBC AUTO-ENTMCNC: 34.4 G/DL (ref 31–36)
MCV RBC AUTO: 96.6 FL (ref 80–100)
MONOCYTES ABSOLUTE: 0.4 K/UL (ref 0–1.3)
MONOCYTES RELATIVE PERCENT: 7 %
MYELOCYTE PERCENT: 1 %
NEUTROPHILS ABSOLUTE: 4 K/UL (ref 1.7–7.7)
NEUTROPHILS RELATIVE PERCENT: 67 %
PDW BLD-RTO: 14.7 % (ref 12.4–15.4)
PHOSPHORUS: 2.8 MG/DL (ref 2.5–4.9)
PLATELET # BLD: 84 K/UL (ref 135–450)
PMV BLD AUTO: 8.7 FL (ref 5–10.5)
POTASSIUM SERPL-SCNC: 3.7 MMOL/L (ref 3.5–5.1)
RBC # BLD: 3.55 M/UL (ref 4.2–5.9)
SODIUM BLD-SCNC: 142 MMOL/L (ref 136–145)
WBC # BLD: 5.7 K/UL (ref 4–11)

## 2020-02-16 PROCEDURE — 6370000000 HC RX 637 (ALT 250 FOR IP): Performed by: INTERNAL MEDICINE

## 2020-02-16 PROCEDURE — 80048 BASIC METABOLIC PNL TOTAL CA: CPT

## 2020-02-16 PROCEDURE — 6360000002 HC RX W HCPCS: Performed by: INTERNAL MEDICINE

## 2020-02-16 PROCEDURE — 2500000003 HC RX 250 WO HCPCS: Performed by: INTERNAL MEDICINE

## 2020-02-16 PROCEDURE — 36415 COLL VENOUS BLD VENIPUNCTURE: CPT

## 2020-02-16 PROCEDURE — 83605 ASSAY OF LACTIC ACID: CPT

## 2020-02-16 PROCEDURE — 84100 ASSAY OF PHOSPHORUS: CPT

## 2020-02-16 PROCEDURE — 94760 N-INVAS EAR/PLS OXIMETRY 1: CPT

## 2020-02-16 PROCEDURE — 83735 ASSAY OF MAGNESIUM: CPT

## 2020-02-16 PROCEDURE — 85025 COMPLETE CBC W/AUTO DIFF WBC: CPT

## 2020-02-16 PROCEDURE — 2580000003 HC RX 258: Performed by: INTERNAL MEDICINE

## 2020-02-16 RX ORDER — CIPROFLOXACIN 500 MG/1
500 TABLET, FILM COATED ORAL 2 TIMES DAILY
Qty: 20 TABLET | Refills: 0 | Status: SHIPPED | OUTPATIENT
Start: 2020-02-16 | End: 2020-02-26

## 2020-02-16 RX ADMIN — PIPERACILLIN AND TAZOBACTAM 3.38 G: 3; .375 INJECTION, POWDER, LYOPHILIZED, FOR SOLUTION INTRAVENOUS at 04:03

## 2020-02-16 RX ADMIN — ASPIRIN 81 MG: 81 TABLET, COATED ORAL at 09:22

## 2020-02-16 RX ADMIN — METOPROLOL SUCCINATE 50 MG: 50 TABLET, EXTENDED RELEASE ORAL at 09:22

## 2020-02-16 RX ADMIN — PHENYTOIN SODIUM 200 MG: 100 CAPSULE ORAL at 09:22

## 2020-02-16 RX ADMIN — PHENYTOIN SODIUM 100 MG: 100 CAPSULE ORAL at 12:21

## 2020-02-16 RX ADMIN — HYDRALAZINE HYDROCHLORIDE 10 MG: 20 INJECTION INTRAMUSCULAR; INTRAVENOUS at 06:58

## 2020-02-16 RX ADMIN — LABETALOL HYDROCHLORIDE 10 MG: 5 INJECTION INTRAVENOUS at 00:18

## 2020-02-16 RX ADMIN — AMLODIPINE BESYLATE 10 MG: 10 TABLET ORAL at 09:22

## 2020-02-16 RX ADMIN — PIPERACILLIN AND TAZOBACTAM 3.38 G: 3; .375 INJECTION, POWDER, LYOPHILIZED, FOR SOLUTION INTRAVENOUS at 12:21

## 2020-02-16 ASSESSMENT — PAIN SCALES - GENERAL
PAINLEVEL_OUTOF10: 0
PAINLEVEL_OUTOF10: 0

## 2020-02-16 NOTE — DISCHARGE INSTR - COC
Continuity of Care Form    Patient Name: Candace Lara   :  1934  MRN:  [id number]    Admit date:  2/10/2020  Discharge date:  ***    Code Status Order: Prior   Advance Directives:   [facility] Documentation     Date/Time Healthcare Directive Type of Healthcare Directive Copy in [address] [place] [address] [address] Agent's Name Healthcare Agent's Phone Number    02/10/20 1836  No, patient does not have an advance directive for healthcare treatment -- -- -- -- --          Admitting Physician:  Brenna Aguayo MD  PCP: KENZIE Gutierrez - CNP    Discharging Nurse: [facility] Unit/Room#: Y5A-1791/3103-01  Discharging Unit Phone Number: ***    Emergency Contact:   Extended Emergency Contact Information  Primary Emergency Contact: [address] [place] [address] Phone: [phone]  Relation: Child    Past Surgical History:  Past Surgical History:   Procedure Laterality Date    ABDOMINAL AORTIC ANEURYSM REPAIR, ENDOVASCULAR  16    BLADDER TUMOR EXCISION      CATARACT REMOVAL WITH IMPLANT Bilateral     HERNIA REPAIR      NEPHROSTOMY      SKIN CANCER EXCISION  10/15/2015    squamous    TOTAL NEPHRECTOMY Left 1994       Immunization History:   Immunization History   Administered Date(s) Administered    Influenza Virus Vaccine 2011    Influenza, High Dose (Fluzone 65 yrs and older) 10/13/2016, 10/26/2017    Pneumococcal Conjugate 13-valent ([id number]) 2018    Pneumococcal Polysaccharide ([id number]) 10/02/2004    Td, unspecified formulation 2006       Active Problems:  Patient Active Problem List   Diagnosis Code    HTN (hypertension) I10    Sleep disorder G47.9    COPD (chronic obstructive pulmonary disease) ([facility] J44.9    GERD (gastroesophageal reflux disease) K21.9    Seizures ([facility] R56.9    Trigeminal neuralgia G50.0    Hyperlipidemia E78.5    History of renal calculi Z87.442    History of nephrectomy Z90.5    Basal ganglia infarction ([facility] I63.9    Hyperinflation of lungs R09.89    Essential hypertension I10    Abdominal aortic aneurysm (AAA) >39 mm diameter ([facility]) I71.4    S/P AAA (abdominal aortic aneurysm) repair Z98.890, Z86.79    NSTEMI (non-ST elevated myocardial infarction) ([facility]) I21.4    Mental status change resolved Z86.59       Isolation/Infection:   Isolation          No Isolation        Patient Infection Status     None to display          Nurse Assessment:  Last Vital Signs: BP (!) 164/78   Pulse 75   Temp 98.1 °F (36.7 °C) (Oral)   Resp 18   Ht 5' 8\" (1.727 m)   Wt 179 lb 10.8 oz (81.5 kg)   SpO2 96%   BMI 27.32 kg/m²     Last documented pain score (0-10 scale): Pain Level: 0  Last Weight:   Wt Readings from Last 1 Encounters:   02/16/20 179 lb 10.8 oz (81.5 kg)     Mental Status:  {IP PT MENTAL STATUS:25400}    IV Access:  { ADRIAN IV ACCESS:320973909}    Nursing Mobility/ADLs:  Walking   {CHP DME TMUC:309780854}  Transfer  {CHP DME MWFJ:018192806}  Bathing  {CHP DME BVSZ:047441908}  Dressing  {CHP DME OIEL:479376909}  Toileting  {CHP DME SWRR:377100639}  Feeding  {CHP DME ASQJ:867176111}  Med Admin  {P DME FUAU:615952985}  Med Delivery   {[facility] MED Delivery:389027671}    Wound Care Documentation and Therapy:  Incision 04/27/16 Groin Right;Left (Active)   Number of days: 1390       Wound 02/10/20 Groin Anterior;Right (Active)   Wound Other 2/15/2020  7:50 PM   Dressing Status Clean;Dry; Intact 2/16/2020  7:52 AM   Dressing/Treatment 4x4;Tegaderm 2/16/2020  7:52 AM   Wound Assessment Clean;Dry; Intact 2/16/2020  7:52 AM   Drainage Amount None 2/16/2020  7:52 AM   Odor None 2/16/2020  7:52 AM   Number of days: 5        Elimination:  Continence:   · Bowel: {YES / EB:02080}  · Bladder: {YES / DJ:56365}  Urinary Catheter: {Urinary Catheter:761726261}   Colostomy/Ileostomy/Ileal Conduit: {YES / ZU:18585}       Date of Last BM: ***    Intake/Output Summary (Last 24 hours) at 2/16/2020 1647  Last data filed at 2/16/2020 9935  Gross

## 2020-02-16 NOTE — PROGRESS NOTES
Data- discharge order received, pt verbalized agreement to discharge, disposition to previous residence    Action- discharge instructions prepared and given to Mrs Jonna Lora and her Michaeline Shy, pt and family verbalized understanding. Medication information packet given r/t NEW and/or CHANGED prescriptions emphasizing name/purpose, pt and family verbalized understanding. Discharge instruction summary: Diet- cardiac, Activity- as tolerated with a walker or cane, Primary Care Physician as follows: KENZIE Faith - [phone] f/u appointment to be made by patient,  prescription medications filled at [facility]. I    Response- Pt belongings gathered, IV removed. Disposition is home. Nursing transported with family, taken to lobby via w/c, no complications.

## 2020-02-16 NOTE — PROGRESS NOTES
BP has been elevated overnight. PRN Labetalol and Hydralazine each given 1 time during shift. Pt sitting up in the chair this am, set up to eat breakfast.  Denies needs at this time. Call light in reach. Will monitor.

## 2020-02-16 NOTE — PROGRESS NOTES
Hospitalist Progress Note      PCP: Liliam Thompson, APRN - CNP    Date of Admission: 2/10/2020    Subjective: suspect close to baseline, cont to be pleasantly confused    Medications:  Reviewed    Infusion Medications   Scheduled Medications    phenytoin  200 mg Oral QAM    phenytoin  100 mg Oral TID    amLODIPine  10 mg Oral Daily    piperacillin-tazobactam  3.375 g Intravenous Q8H    metoprolol succinate  50 mg Oral Daily    pravastatin  40 mg Oral Nightly    aspirin  81 mg Oral Daily    sodium chloride flush  10 mL Intravenous 2 times per day     PRN Meds: hydrALAZINE, labetalol, sodium chloride flush, acetaminophen, sodium chloride, magnesium hydroxide, ondansetron      Intake/Output Summary (Last 24 hours) at 2/15/2020 2230  Last data filed at 2/15/2020 2047  Gross per 24 hour   Intake 720 ml   Output --   Net 720 ml       Physical Exam Performed:    BP (!) 158/74   Pulse 74   Temp 98.3 °F (36.8 °C) (Oral)   Resp 18   Ht 5' 8\" (1.727 m)   Wt 177 lb 0.5 oz (80.3 kg)   SpO2 92%   BMI 26.92 kg/m²     General appearance: NAD  HEENT: Normal cephalic, atraumatic without obvious deformity. Pupils equal, round, and reactive to light.  Extra ocular muscles intact. Conjunctivae/corneas clear. Neck: Supple, with full range of motion. No jugular venous distention. Trachea midline. Respiratory:  Diminished b/l  Cardiovascular: Regular rate and rhythm with normal S1/S2   Abdomen: Soft, non-tender, non-distended with normal bowel sounds. Musculoskeletal: No clubbing, cyanosis or edema bilaterally. Skin: Skin color, texture, turgor normal.  No rashes or lesions.   Neurologic:  awake, alert, grossly non focal  Psychiatric: alert, oriented to self  Capillary Refill: Brisk,< 3 seconds   Peripheral Pulses: +2 palpable, equal bilaterally    Labs:   Recent Labs     02/13/20  0447 02/14/20  0519 02/15/20  0452   WBC 13.3* 13.3* 9.0   HGB 11.3* 11.6* 12.2*   HCT 33.4* 34.3* 35.3*   PLT 51* 68* 82*     Recent

## 2020-02-16 NOTE — PROGRESS NOTES
Patient in chair, he is more oriented today than yesterday, oriented x3 not time. His BP is still elevated but is better after morning medications, other vitals are stable. He denies pain, nausea vomiting. He is up with one staff assist and a walker. He did not eat much breakfast. He reports hospital food is not like his wife's cooking. Call light in reach.

## 2020-02-16 NOTE — CARE COORDINATION
DISCHARGE SUMMARY     DATE OF DISCHARGE: 02/16/2020  DISCHARGE DESTINATION: Home with family. AGENCY IDENTIFIED:  [facility]  [address],  [place], [address]  Phone: [phone]  Fax: [phone]      TRANSPORTATION: Patient's son at bedside and stated that he will transport him home. COMMENTS: Patient's son stated that he believes his dad has history with this agency so he only wants us to start with one referral. SW placed phone call and left message with after hours. SW reached out to Dr. Niru Warren regarding the need for a home care order as it was not placed in the system before discharge. SW to follow up on Monday morning to ensure that the agency received the referral and are able to accommodate his request for services. SW asked son to take the list of providers home in case the agency states that they are unable to take patient back for some reason. Respectfully submitted,    HILARY Ward  [facility]   [phone]    Electronically signed by HILARY Reynaga on 2/16/2020 at 4:39 PM

## 2020-02-17 NOTE — CARE COORDINATION
SW placed phone call to [address] [place] [address] for this patient. SW asked if they asked if they received a referral for this patient. SW sent to admissions voicemail and will wait for a call back. Respectfully submitted,    HILARY Hall  [facility]   [phone]    Electronically signed by HILARY Hollis on 2/17/2020 at 8:59 AM

## 2020-02-18 NOTE — ADT AUTH CERT
Utilization Reviews         Myocardial Infarction - Care Day 5 (2/14/2020) by Elina Bey RN         Review Status Review Entered   Completed 2/17/2020 10:47       Criteria Review      Care Day: 5 Care Date: 2/14/2020 Level of Care:    Guideline Day 3    Level Of Care    (X) Intermediate care or telemetry to discharge    2/17/2020 10:47 AM EST by Anastasiia Randle      MEDICAL SURGICAL UNIT WITH TELEMETRY    Clinical Status    (X) * Hemodynamic stability    (X) * Chest pain, dyspnea, or anginal equivalent absent    (X) * No evidence of bleeding or recurrent myocardial ischemia    (X) * Dangerous arrhythmia absent    (X) * Vascular access site without evidence of infection, aneurysm, or growing hematoma    ( ) * Renal function at baseline or acceptable for next level of care    2/17/2020 10:47 AM EST by Anastasiia Randle      creatinine improving 1.7 today    ( ) * Discharge plans and education understood    Activity    (X) * Ambulatory    2/17/2020 10:47 AM EST by Anastasiia Randle      Up with assistance    Routes    (X) * Oral hydration, medications, and diet    (X) Heart-healthy diet    2/17/2020 10:47 AM EST by Anastasiia Randle      Cardiac Diet    Medications    (X) * Anticoagulation absent    (X) Antiplatelet agents (eg, aspirin, clopidogrel, ticagrelor)    2/17/2020 10:47 AM EST by Anastasiia Randle      Aspirin 81mg po daily    (X) Beta-blocker    2/17/2020 10:47 AM EST by Anastasiia Randle      Toprol XL 50mg po daily  Labetalol 10mg IV every 6 hours prn - received x 1 today (178/78)    (X) Statin medication    2/17/2020 10:47 AM EST by Anastasiia Randle      Pravastatin 40mg po nightly    * Milestone   Additional Notes   2/14/2020  Care Day 5      VS:  T. 36.6, b/p 189/91, HR 80, R 17, SpO2 97% 2L O2 NC         Labs:      Sodium: 139   Potassium: 3.7   Chloride: 107   CO2: 21   BUN: 42 (H)   Creatinine: 1.7 (H)   Anion Gap: 11   GFR Non-: 38 (A)   GFR : 46 (A)   Magnesium: 2.10   Lactic Acid: 1.0 contrast which would have jeopardize his kidneys.               2/14/2020   Doing well sitting up in the chair no complaints   Creatinine is down to 1.7   I think if the IV antibiotic course is complete he may be discharged      Internal Medicine Note   Assessment/Plan:       Active Hospital Problems     Diagnosis   · Mental status change resolved [Z86.59]                   NSTEMI - cardio following, s/p cath with occlusion but no PCI performed, medical mgmt       Presented with syncope - suspect related to NSTEMI       Sepsis/Fever - suspect 2/2 bacteremia, reviewed CT chest/UA, started on cefepime/vanco-->zosyn, blood cx with e coli, CT abd reviewed       E coli bacteremia - cont IV abx, recheck blood cx neg to date, pansensitive. If plan is d/c to SNF, will place PICC, if plan home on d/c, will cont another 2 days of IV abx, monitor PCT and WBC       ARF on CKD II - baseline creat 1.2-1.3, creat improved from 2.3-->2.1-->1.7 today, suspect pre-renal vs ATN from hypotension, also had contrast with cath. Monitor creat, nephrology consulted. Pt has h/o nephrectomy - has a single kidney       Lactic acidosis - suspect 2/2 sepsis, on IVF, resolved       HTN - controlled        Suspected early dementia/acute metabolic encephalopathy - suspect 2/2 above, improving           DVT Prophylaxis: SCD   Diet: DIET CARDIAC;   Code Status: Full Code       PT/OT Eval Status: ordered, rec SNF but pt and family want pt to go home       Dispo - cont care, will cont 2 more days IV abx since pt and son want pt to come home on d/c         Case Management Note   Sw received return phone call from patent's son, Filippo Delvalle. He reported that the family has talked about it and they prefer to bring patient home with home care. He reported that someone will be with patient 24/7 to assist him. He reported patient had home care in the past and did well at home. Son cannot remember the home care agency but will let Sw know.  Sw left home care list 2/17/2020 10:44 AM EST by Helena Toth      Toprol XL 50mg po daily    (X) Statin medication    2/17/2020 10:44 AM EST by Helena Toth      Pravastatin 40mg po nightly    * Milestone   Additional Notes   2/13/2020  Care Day 4      VS:  T. 36.8, b/p 154/77, HR 84, R 17, SpO2 97% RA      Labs:      Sodium: 139   Potassium: 3.5   Chloride: 106   CO2: 20 (L)   BUN: 45 (H)   Creatinine: 2.1 (H)   Anion Gap: 13   GFR Non-: 30 (A)   GFR : 36 (A)   Magnesium: 1.80   Lactic Acid: 1.2   Glucose: 76   Calcium: 8.6   Phosphorus: 2.9      WBC: 13.3 (H)   RBC: 3.41 (L)   Hemoglobin Quant: 11.3 (L)   Hematocrit: 33.4 (L)   MCV: 97.9   MCH: 33.0   MCHC: 33.7   MPV: 9.2   RDW: 15.1   Platelet Count: 51 (L)   Neutrophils %: 86.0   Lymphocyte %: 1.0   Monocytes %: 11.0   Eosinophils %: 1.0   Basophils %: 1.0   Neutrophils Absolute: 11.4 (H)   Lymphocytes Absolute: 0.1 (L)   Monocytes Absolute: 1.5 (H)   Eosinophils Absolute: 0.1   Basophils Absolute: 0.1   Vacuolated Neutrophils: Present (A)   Anisocytosis: 1+ (A)         Procalcitonin: 11.16 (H)      CT Abd/Pelvis   Impression   1.  No CT evidence of an acute intra-abdominal or intrapelvic process.  No   specific finding to suggest etiology for E. coli bacteremia. 2. Cholelithiasis without findings of acute cholecystitis. 3. 5.4 cm AAA, status post endovascular stent graft exclusion.  I have no   prior study for comparison. 4.  Diverticulosis coli without CT evidence of acute diverticulitis. 5. Punctate nonobstructing calculus inferior pole right kidney.  Status post   left nephrectomy. 6. Prostate gland enlargement typical of BPH. 7. Small volume bilateral pleural effusion with minimal bibasilar relaxation   atelectasis.       Additional Orders:   Zosyn 3.375 gr IV every 8 hours   Normal Saline @ 100ml/h - discontinued this afternoon   Telemetry   Consult to Nephrology, Hospitalist, PT, OT   Daily Weight, I/O every 8h   VS every 4 hours

## 2020-02-19 NOTE — DISCHARGE SUMMARY
[address]    DISCHARGE SUMMARY        Beena Monet  [id number] 75 y.o. 1934    Admit date: 2/10/2020    Discharge date: 2/16/2020     Admitting Physician: Speedy Gillette MD   Discharge Physician: Speedy Gillette     Admission Diagnoses: Mental status change resolved [Z86.59]  Mental status change resolved [Z86.59]    Discharge Diagnoses: Non-STEMI    Discharged Condition: good    Hospital Course: Beena Monet was admitted to [address] [place] [address] with \"syncope\". According to the family the patient was sitting watching TV. They just stepped out and when they came back they found the patient unresponsive for a few minutes. He then came out of it. The patient had a troponin elevation and because of that he was transferred to [facility] [place] we performed an angiogram and found subtotal occlusion of the mid RCA with good collaterals from the left circulation we decided not to open the artery because the patient has a very sedentary lifestyle. That evening the patient had a rapid response because of high fever chills. He was diagnosed with sepsis. He was treated for that. He also developed some mild renal failure despite hydration from contrast nephropathy which also returned back to normal.      Significant Diagnostic Studies:   Single-vessel coronary artery disease  Mid RCA with subtotal occlusion with heavy calcification: Distal right has collaterals from the left circulation  Treated medically  Lab Results   Component Value Date    CREATININE 1.3 02/16/2020    BUN 23 (H) 02/16/2020     02/16/2020    K 3.7 02/16/2020     02/16/2020    CO2 24 02/16/2020      Lab Results   Component Value Date    WBC 5.7 02/16/2020    HGB 11.8 (L) 02/16/2020    HCT 34.3 (L) 02/16/2020    MCV 96.6 02/16/2020    PLT 84 (L) 02/16/2020    No results found for: INR, PROTIME No results found for: BNP    Patient Instructions:     Follow up with primary care provider 8Weeks  Follow up with cardiology 3Weeks  Follow up with other consultant physicians at their directions. Discharge Medications:     Pao Kelly   Home Medication Instructions [facility]:[id number]    Printed on:02/19/20 0948   Medication Information                      amLODIPine (NORVASC) 10 MG tablet  Take 1 tablet by mouth daily             aspirin 81 MG EC tablet  Take 1 tablet by mouth daily             ciprofloxacin (CIPRO) 500 MG tablet  Take 1 tablet by mouth 2 times daily for 10 days             furosemide (LASIX) 20 MG tablet  Take 20 mg by mouth daily             gabapentin (NEURONTIN) 600 MG tablet  Take 600 mg by mouth 2 times daily. guaiFENesin (MUCINEX) 600 MG SR tablet  Take 2 tablets by mouth 2 times daily             HYDROcodone-acetaminophen (NORCO) 5-325 MG per tablet  Take 1 tablet by mouth 2 times daily as needed for Pain. irbesartan (AVAPRO) 300 MG tablet  TAKE 1 TABLET BY MOUTH EVERY DAY             metoprolol tartrate (LOPRESSOR) 50 MG tablet  Take 1 tablet by mouth 2 times daily             omeprazole (PRILOSEC) 40 MG delayed release capsule  Take 1 capsule by mouth daily             phenytoin (DILANTIN) 100 MG ER capsule  Take 5 tablets daily             potassium chloride (MICRO-K) 10 MEQ extended release capsule  Take 10 mEq by mouth daily             pravastatin (PRAVACHOL) 40 MG tablet  Take 40 mg by mouth daily                  Discussed with patient and nursing. Medications and discharge instructions reviewed with patient and nursing.     Disposition: home    Signed:  Nerissa Wilkinson M.D.   2/19/2020 9:48 AM    [facility], [facility] [place], [address] [facility]  Phone: [phone]  Fax: [phone]

## 2020-02-19 NOTE — PROGRESS NOTES
Patient came from [address] [place] [address] with syncope. He ruled in for non-STEMI with a troponin elevation  Heart catheterization showed a subtotal occlusion of mid right coronary artery which could have been the culprit artery.

## 2020-03-05 NOTE — PROGRESS NOTES
Asked to clarify about whether this patient had non-STEMI and syncope    The patient actually came to [address] [place] [address] with syncope. He subsequently ruled in for non-STEMI. So technically yes his non-STEMI caused his syncope.     Willi Melendez M.D.

## 2020-06-29 RX ORDER — AMLODIPINE BESYLATE 10 MG/1
TABLET ORAL
Qty: 90 TABLET | Refills: 0 | Status: SHIPPED | OUTPATIENT
Start: 2020-06-29

## 2020-06-30 PROBLEM — I25.118 CORONARY ARTERY DISEASE OF NATIVE ARTERY OF NATIVE HEART WITH STABLE ANGINA PECTORIS (HCC): Status: ACTIVE | Noted: 2020-06-30

## 2020-09-21 ASSESSMENT — ENCOUNTER SYMPTOMS
BLOOD IN STOOL: 0
EYE REDNESS: 0
NAUSEA: 0
COUGH: 0
WHEEZING: 0
SHORTNESS OF BREATH: 0

## 2020-09-21 NOTE — PROGRESS NOTES
Aziza Noyola is a 80 y.o. male    Reason for Visit:F/u NSTEM and cath 2/2020                    HPI Aziza Noyola was admitted to [facility] 2/2020 for NSTEMI and underwent coronary angiogram and medical management of his CAD was recommended. Today he denies exertional chest pain, palpitations, dizziness, syncope, and worsening dyspnea. He states that his feet swell at the end of the day. He ambulates with a cane. His family accompanies him. Review of Systems   Constitutional: Negative for chills, diaphoresis and fever. HENT: Negative for congestion, nosebleeds and tinnitus. Eyes: Negative for redness. Respiratory: Negative for cough, shortness of breath and wheezing. Cardiovascular: Positive for leg swelling. Negative for chest pain and palpitations. Gastrointestinal: Negative for blood in stool and nausea. Genitourinary: Negative for dysuria and hematuria. Musculoskeletal: Negative for myalgias and neck pain. Neurological: Negative for dizziness. Hematological: Does not bruise/bleed easily. Physical Exam  Vitals signs and nursing note reviewed. Constitutional:       Appearance: He is well-developed. HENT:      Head: Normocephalic and atraumatic. Eyes:      Conjunctiva/sclera: Conjunctivae normal.   Neck:      Musculoskeletal: Normal range of motion and neck supple. Cardiovascular:      Rate and Rhythm: Normal rate and regular rhythm. Heart sounds: S1 normal and S2 normal. No murmur. No gallop. Comments: Cath site stable  Pulmonary:      Effort: Pulmonary effort is normal.      Comments: Few crackles  Abdominal:      General: Bowel sounds are normal.      Palpations: Abdomen is soft. Musculoskeletal: Normal range of motion. General: Swelling (mild BLE) present. Skin:     General: Skin is warm and dry. Neurological:      Mental Status: He is alert and oriented to person, place, and time.    Psychiatric:         Behavior: Behavior normal.       Wt Readings from Last 3 Encounters:   10/06/20 170 lb 6.4 oz (77.3 kg)   20 179 lb 10.8 oz (81.5 kg)   18 201 lb (91.2 kg)     BP Readings from Last 3 Encounters:   10/06/20 110/70   20 (!) 164/78   18 124/70     Pulse Readings from Last 3 Encounters:   10/06/20 70   20 75   18 60         Current Outpatient Medications:     amLODIPine (NORVASC) 10 MG tablet, TAKE 1 TABLET BY MOUTH EVERY DAY, Disp: 90 tablet, Rfl: 0    furosemide (LASIX) 20 MG tablet, Take 20 mg by mouth daily, Disp: , Rfl:     gabapentin (NEURONTIN) 600 MG tablet, Take 600 mg by mouth 2 times daily. , Disp: , Rfl:     HYDROcodone-acetaminophen (NORCO) 5-325 MG per tablet, Take 1 tablet by mouth 2 times daily as needed for Pain. , Disp: , Rfl:     pravastatin (PRAVACHOL) 40 MG tablet, Take 40 mg by mouth daily, Disp: , Rfl:     potassium chloride (MICRO-K) 10 MEQ extended release capsule, Take 10 mEq by mouth daily, Disp: , Rfl:     metoprolol tartrate (LOPRESSOR) 50 MG tablet, Take 1 tablet by mouth 2 times daily, Disp: 60 tablet, Rfl: 5    phenytoin (DILANTIN) 100 MG ER capsule, Take 5 tablets daily, Disp: 150 capsule, Rfl: 5    omeprazole (PRILOSEC) 40 MG delayed release capsule, Take 1 capsule by mouth daily, Disp: 30 capsule, Rfl: 5    aspirin 81 MG EC tablet, Take 1 tablet by mouth daily, Disp: 30 tablet, Rfl: 3    Past Medical History:   Diagnosis Date    [facility]     pt unaware of this    Bladder cancer ([facility]     CAD (coronary artery disease)     S/P NSTEMI 2020-->Cath showed  with subtotal RCA occlusion with collaterals, LVEF normal med tx recomended.      CKD (chronic kidney disease)     --Cr 1.3-1.5.    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, including shoulder     Trigeminal neuralgia        Social History     Socioeconomic History    Marital status:      Spouse name: None    Number of children: None    Years of education: None    Highest education level: None   Occupational History    None   Social Needs    Financial resource strain: None    Food insecurity     Worry: None     Inability: None    Transportation needs     Medical: None     Non-medical: None   Tobacco Use    Smoking status: Former Smoker     Packs/day: 0.25     Years: 70.00     Pack years: 17.50     Last attempt to quit: 2016     Years since quittin.1    Smokeless tobacco: Former User    Tobacco comment: quite 03/28/15    Substance and Sexual Activity    Alcohol use: No    Drug use: No    Sexual activity: None   Lifestyle    Physical activity     Days per week: None     Minutes per session: None    Stress: None   Relationships    Social connections     Talks on phone: None     Gets together: None     Attends Sikh service: None     Active member of club or organization: None     Attends meetings of clubs or organizations: None     Relationship status: None    Intimate partner violence     Fear of current or ex partner: None     Emotionally abused: None     Physically abused: None     Forced sexual activity: None   Other Topics Concern    None   Social History Narrative    None       Past Surgical History:   Procedure Laterality Date    ABDOMINAL AORTIC ANEURYSM REPAIR, ENDOVASCULAR  16    BLADDER TUMOR EXCISION      CATARACT REMOVAL WITH IMPLANT Bilateral     DIAGNOSTIC CARDIAC CATH LAB PROCEDURE      HERNIA REPAIR      NEPHROSTOMY      SKIN CANCER EXCISION  10/15/2015    squamous    TOTAL NEPHRECTOMY Left 1994       History reviewed. No pertinent family history.     No Known Allergies    Recent Labs and Imaging reviewed    Assessment     Bladder cancer ([facility]       CAD (coronary artery disease)     S/P NSTEMI 2020-->Cath showed  with subtotal RCA occlusion with collaterals, LVEF normal med tx recomended. Echo 2020 LVH, LVEF normal, mild AI.  CKD (chronic kidney disease). S/p nephrectomy. --Cr 1.3-1.5.       COPD (chronic obstructive pulmonary disease) ([facility] Quit smoking. Managed by PCP       Diabetes mellitus ([facility]     pre-diabetic-no meds. Managed by PCP       CVA. Hx       Seizures. Hx       HLD (hyperlipidemia). Taking statin.  HTN (hypertension). Controlled. Plan  Stable angina. No clinical evidence of CHF. Feet swelling likely secondary to norvasc and venous insufficiency. Continue medical management of CAD. Fasting lipid profile, CMP soon. Continue ASA , BB and statin. Return in about 6 months (around 2021). This note was scribed in the presence of the physician by Lilly Agosto RN. The scribes documentation has been prepared under my direction and personally reviewed by me in its entirety. I confirm that the note above accurately reflects all work, treatment, procedures, and medical decision making performed by me.

## 2020-10-06 ENCOUNTER — OFFICE VISIT (OUTPATIENT)
Dept: CARDIOLOGY CLINIC | Age: 85
End: 2020-10-06
Payer: COMMERCIAL

## 2020-10-06 VITALS
DIASTOLIC BLOOD PRESSURE: 70 MMHG | OXYGEN SATURATION: 96 % | HEART RATE: 70 BPM | BODY MASS INDEX: 25.82 KG/M2 | WEIGHT: 170.4 LBS | HEIGHT: 68 IN | SYSTOLIC BLOOD PRESSURE: 110 MMHG

## 2020-10-06 PROCEDURE — 99214 OFFICE O/P EST MOD 30 MIN: CPT | Performed by: INTERNAL MEDICINE

## 2020-10-06 NOTE — LETTER
[address] Cardiology - [facility]  [address] [place]  Phone: [phone]  Fax: [phone]    Corey Beltrán MD        October 14, 2020     Eusebio Ortiz, APRN - CNP  [address]    Patient: Ju Fragoso  MR Number: <[id number]>  YOB: 1934  Date of Visit: 10/6/2020    Dear Dr. Eusebio Ortiz: Thank you for your referral. Progress note attached in visit summary. If you have questions, please do not hesitate to call me. I look forward to following Meghan Pal along with you.     Sincerely,        Corey Beltrán MD

## 2020-10-06 NOTE — PATIENT INSTRUCTIONS
Patient Education        Learning About Coronary Artery Disease (CAD)  What is coronary artery disease? Coronary artery disease (CAD) occurs when plaque builds up in the arteries that bring oxygen-rich blood to your heart. Plaque is a fatty substance made of cholesterol, calcium, and other substances in the blood. This process is called hardening of the arteries, or atherosclerosis. What happens when you have coronary artery disease? · Plaque may narrow the coronary arteries. Narrowed arteries cause poor blood flow. This can lead to angina symptoms such as chest pain or discomfort. If blood flow is completely blocked, you could have a heart attack. · You can slow CAD and reduce the risk of future problems by making changes in your lifestyle. These include quitting smoking and eating heart-healthy foods. · Treatments for CAD, along with changes in your lifestyle, can help you live a longer and healthier life. How can you prevent coronary artery disease? · Do not smoke. It may be the best thing you can do to prevent heart disease. If you need help quitting, talk to your doctor about stop-smoking programs and medicines. These can increase your chances of quitting for good. · Be active. Get at least 30 minutes of exercise on most days of the week. Walking is a good choice. You also may want to do other activities, such as running, swimming, cycling, or playing tennis or team sports. · Eat heart-healthy foods. Eat more fruits and vegetables and less foods that contain saturated and trans fats. Limit alcohol, sodium, and sweets. · Stay at a healthy weight. Lose weight if you need to. · Manage other health problems such as diabetes, high blood pressure, and high cholesterol. How is coronary artery disease treated? · Your doctor will suggest that you make lifestyle changes. For example, your doctor may ask you to eat healthy foods, quit smoking, lose extra weight, and be more active.   · You will have to take medicines. · Your doctor may suggest a procedure to open narrowed or blocked arteries. This is called angioplasty. Or your doctor may suggest using healthy blood vessels to create detours around narrowed or blocked arteries. This is called bypass surgery. Follow-up care is a key part of your treatment and safety. Be sure to make and go to all appointments, and call your doctor if you are having problems. It's also a good idea to know your test results and keep a list of the medicines you take. Where can you learn more? Go to https://[email or web address]. org and sign in to your [employer] account. Enter ([phone] [phone] in the [employer] box to learn more about \"Learning About Coronary Artery Disease (CAD). \"     If you do not have an account, please click on the \"Sign Up Now\" link. Current as of: December 16, 2019               Content Version: 12.5  © 9516-6498 [employer]wise, Incorporated. Care instructions adapted under license by [facility] ([facility]). If you have questions about a medical condition or this instruction, always ask your healthcare professional. [place] [address] any warranty or liability for your use of this information.

## 2020-11-03 PROBLEM — E78.5 HYPERLIPIDEMIA: Status: RESOLVED | Noted: 2020-11-03 | Resolved: 2020-11-03

## 2020-11-03 PROBLEM — I10 HTN (HYPERTENSION): Status: RESOLVED | Noted: 2020-11-03 | Resolved: 2020-11-03
